# Patient Record
Sex: MALE | Race: WHITE | NOT HISPANIC OR LATINO | Employment: FULL TIME | URBAN - METROPOLITAN AREA
[De-identification: names, ages, dates, MRNs, and addresses within clinical notes are randomized per-mention and may not be internally consistent; named-entity substitution may affect disease eponyms.]

---

## 2017-02-21 ENCOUNTER — LAB VISIT (OUTPATIENT)
Dept: LAB | Facility: HOSPITAL | Age: 57
End: 2017-02-21
Attending: INTERNAL MEDICINE
Payer: COMMERCIAL

## 2017-02-21 ENCOUNTER — OFFICE VISIT (OUTPATIENT)
Dept: INTERNAL MEDICINE | Facility: CLINIC | Age: 57
End: 2017-02-21
Payer: COMMERCIAL

## 2017-02-21 VITALS
HEIGHT: 70 IN | TEMPERATURE: 97 F | OXYGEN SATURATION: 98 % | SYSTOLIC BLOOD PRESSURE: 140 MMHG | WEIGHT: 222.25 LBS | DIASTOLIC BLOOD PRESSURE: 92 MMHG | HEART RATE: 95 BPM | BODY MASS INDEX: 31.82 KG/M2

## 2017-02-21 DIAGNOSIS — R06.83 SNORING: ICD-10-CM

## 2017-02-21 DIAGNOSIS — E66.9 OBESITY (BMI 30.0-34.9): ICD-10-CM

## 2017-02-21 DIAGNOSIS — R51.9 NONINTRACTABLE HEADACHE, UNSPECIFIED CHRONICITY PATTERN, UNSPECIFIED HEADACHE TYPE: ICD-10-CM

## 2017-02-21 DIAGNOSIS — Z11.59 NEED FOR HEPATITIS C SCREENING TEST: ICD-10-CM

## 2017-02-21 DIAGNOSIS — K21.9 GASTROESOPHAGEAL REFLUX DISEASE, ESOPHAGITIS PRESENCE NOT SPECIFIED: Primary | ICD-10-CM

## 2017-02-21 PROCEDURE — 99214 OFFICE O/P EST MOD 30 MIN: CPT | Mod: S$GLB,,, | Performed by: INTERNAL MEDICINE

## 2017-02-21 PROCEDURE — 1160F RVW MEDS BY RX/DR IN RCRD: CPT | Mod: S$GLB,,, | Performed by: INTERNAL MEDICINE

## 2017-02-21 PROCEDURE — 84443 ASSAY THYROID STIM HORMONE: CPT

## 2017-02-21 PROCEDURE — 85025 COMPLETE CBC W/AUTO DIFF WBC: CPT

## 2017-02-21 PROCEDURE — 86803 HEPATITIS C AB TEST: CPT

## 2017-02-21 PROCEDURE — 99999 PR PBB SHADOW E&M-EST. PATIENT-LVL III: CPT | Mod: PBBFAC,,, | Performed by: INTERNAL MEDICINE

## 2017-02-21 PROCEDURE — 36415 COLL VENOUS BLD VENIPUNCTURE: CPT | Mod: PO

## 2017-02-21 PROCEDURE — 80053 COMPREHEN METABOLIC PANEL: CPT

## 2017-02-21 RX ORDER — ESOMEPRAZOLE MAGNESIUM 40 MG/1
40 CAPSULE, DELAYED RELEASE ORAL
Qty: 90 CAPSULE | Refills: 0 | Status: SHIPPED | OUTPATIENT
Start: 2017-02-21 | End: 2017-06-23

## 2017-02-21 NOTE — MR AVS SNAPSHOT
[facility]  [address] [facility] [place] [address]  Phone: [phone]  Fax: [phone]                  Myles Huynh   2017 4:00 PM   Office Visit    Description:  Male : 1960   Provider:  Marlon Mills MD   Department:  [facility] - Internal Medicine           Reason for Visit     Follow-up           Diagnoses this Visit        Comments    Gastroesophageal reflux disease, esophagitis presence not specified    -  Primary     Snoring         Obesity (BMI 30.0-34.9)         Nonintractable headache, unspecified chronicity pattern, unspecified headache type                To Do List           Future Appointments        Provider Department Dept Phone    2017 5:15 PM LAB, SAME DAY [facility] [phone]    2017 8:30 AM [facility] [phone]    2017 9:35 AM LABORATORY, [facility] [phone]    2017 8:40 AM Reno Pina MD [facility]ot[facility] Oncology [phone]      Goals (5 Years of Data)     None      Follow-Up and Disposition     Return if symptoms worsen or fail to improve.       These Medications        Disp Refills Start End    esomeprazole (NEXIUM) 40 MG capsule 90 capsule 0 2017 3/23/2017    Take 1 capsule (40 mg total) by mouth before breakfast. - Oral    Pharmacy: [facility] - [place], [place] - [address] [facility] [address] Ph #: [phone]         [facility]     [facility] -  Assistance  Registered nurses in the [facility] provide clinical advisement, health education, appointment booking, and other advisory services.  Call for this free service at [phone].             Medications           Message regarding Medications     Verify the changes and/or additions to your medication regime listed below are the same as discussed with your clinician today.  If any of these changes or  "additions are incorrect, please notify your healthcare provider.        START taking these NEW medications        Refills    esomeprazole (NEXIUM) 40 MG capsule 0    Sig: Take 1 capsule (40 mg total) by mouth before breakfast.    Class: Normal    Route: Oral      STOP taking these medications     omeprazole (PRILOSEC) 40 MG capsule Take 1 capsule by mouth once daily.           Verify that the below list of medications is an accurate representation of the medications you are currently taking.  If none reported, the list may be blank. If incorrect, please contact your healthcare provider. Carry this list with you in case of emergency.           Current Medications     rivaroxaban (XARELTO) 20 mg Tab Take 1 tablet (20 mg total) by mouth daily with dinner or evening meal.    esomeprazole (NEXIUM) 40 MG capsule Take 1 capsule (40 mg total) by mouth before breakfast.           Clinical Reference Information           Your Vitals Were     BP Pulse Temp Height Weight SpO2    140/92 (BP Location: Right arm, Patient Position: Sitting) 95 97.1 °F (36.2 °C) (Tympanic) 5' 10" (1.778 m) 100.8 kg (222 lb 3.6 oz) 98%    BMI                31.89 kg/m2          Blood Pressure          Most Recent Value    BP  (!)  140/92      Allergies as of 2/21/2017     Flagyl [Metronidazole]      Immunizations Administered on Date of Encounter - 2/21/2017     None      Orders Placed During Today's Visit      Normal Orders This Visit    Ambulatory referral to Pulmonology     Future Labs/Procedures Expected by Expires    CBC auto differential  2/21/2017 4/22/2018    Comprehensive metabolic panel  2/21/2017 4/22/2018    TSH  2/21/2017 4/22/2018      Instructions    Allegra over counter daily    Tylenol 500-1000 mg every 8 hours as needed for headache       Language Assistance Services     ATTENTION: Language assistance services are available, free of charge. Please call [phone].      ATENCIÓN: Si andrala day, tiene a villegas disposición servicios " gregos de asistencia lingüística. Giovanni jensen [phone].     YOVANY Ý: N?u b?n nói Ti?ng Vi?t, có các d?ch v? h? tr? ngôn ng? mi?n phí dành cho b?n. G?i s? [phone].         [facility] - [facility] complies with applicable Federal civil rights laws and does not discriminate on the basis of race, color, national origin, age, disability, or sex.

## 2017-02-21 NOTE — PROGRESS NOTES
"Subjective:      Patient ID: Myles Huynh is a 56 y.o. male.    Chief Complaint: Follow-up    HPI Comments: 55 yo with Patient Active Problem List:     External hemorrhoid     IBS (irritable bowel syndrome)     History of colon polyps     Acute deep vein thrombosis (DVT) of popliteal vein of left lower extremity     H/o environmental allergies    Here today c/o waking up with JOSEPH almost every morning. Snores. Feels rested in mornings.  HA. Does not improve until after dinner. occ light headed. No aggravating or relieving factors. Joseph is resolved by bed time.  Headache is pressure in nature.  Does not throb.  Rate is a 3-4 out of 10.  No significant associated symptoms.  No photophobia and no phonophobia no emesis  Also c/o "heart burn" burning chest pain to central right chest since December. egd with dilation summer 2016 with mahumza. Has assoc acid taste and belching. Saw mahumza dec with increase in omeprazole from 20 to 40. Pt reports long h/o of regurgitation since childhood. Admits to history of environmental ALLERGIES.  Has done ALLERGY shots with allergist.  He reports few if any current ALLERGY symptoms    Exercising more. Symptoms not preventing him from exercising.     Review of Systems   Constitutional: Negative for chills and fever.   HENT: Negative for ear pain and sore throat.    Eyes: Negative for photophobia, pain, discharge, redness, itching and visual disturbance.   Respiratory: Negative for cough, shortness of breath and wheezing.    Cardiovascular: Negative for chest pain and palpitations.   Gastrointestinal: Negative for abdominal pain, blood in stool, nausea and vomiting.   Genitourinary: Negative for dysuria and hematuria.   Musculoskeletal: Negative for arthralgias.   Skin: Negative for rash and wound.   Neurological: Positive for headaches. Negative for tremors, seizures, syncope, weakness and numbness.   Psychiatric/Behavioral: Negative for dysphoric mood. The patient is not " "nervous/anxious.      Objective:     Visit Vitals    BP (!) 140/92 (BP Location: Right arm, Patient Position: Sitting)    Pulse 95    Temp 97.1 °F (36.2 °C) (Tympanic)    Ht 5' 10" (1.778 m)    Wt 100.8 kg (222 lb 3.6 oz)    SpO2 98%    BMI 31.89 kg/m2       Physical Exam   Constitutional: He is oriented to person, place, and time. He appears well-developed and well-nourished. No distress.   HENT:   Head: Normocephalic and atraumatic.   Mouth/Throat: Oropharynx is clear and moist.   Eyes: EOM are normal. Pupils are equal, round, and reactive to light.   Neck: Neck supple. Carotid bruit is not present. No thyromegaly present.   Cardiovascular: Normal rate and regular rhythm.    Pulmonary/Chest: Breath sounds normal. He has no wheezes. He has no rales.   Abdominal: Soft. Bowel sounds are normal. There is no tenderness.   Musculoskeletal: He exhibits no edema.   Lymphadenopathy:     He has no cervical adenopathy.   Neurological: He is alert and oriented to person, place, and time.   Skin: Skin is warm and dry.   Psychiatric: He has a normal mood and affect. His behavior is normal.         Assessment:     1. Gastroesophageal reflux disease, esophagitis presence not specified    2. Snoring    3. Obesity (BMI 30.0-34.9)    4. Nonintractable headache, unspecified chronicity pattern, unspecified headache type      Plan:   Gastroesophageal reflux disease, esophagitis presence not specified  Change prilosec to nexium  -     esomeprazole (NEXIUM) 40 MG capsule; Take 1 capsule (40 mg total) by mouth before breakfast.  Dispense: 90 capsule; Refill: 0    Snoring  -     Ambulatory referral to Pulmonology    Obesity (BMI 30.0-34.9)  -     Ambulatory referral to Pulmonology    Nonintractable headache, unspecified chronicity pattern, unspecified headache type  -     Ambulatory referral to Pulmonology      Please get last egd report and pathology reports from [facility] over counter daily    Cbc, cmp, " tsh    Tylenol 500-1000 mg every 8 hours as needed for headache    Lab Frequency Next Occurrence   Hepatitis C antibody Once 12/23/2016   DRVVT Once 5/15/2017   HEXAGONAL PHOSPHOLIPID NEUTRALIZATION Once 5/15/2017   D dimer, quantitative Once 5/15/2017   US Lower Extremity Veins Left Once 5/15/2017         Return if symptoms worsen or fail to improve.

## 2017-02-22 LAB
ALBUMIN SERPL BCP-MCNC: 3.9 G/DL
ALP SERPL-CCNC: 70 U/L
ALT SERPL W/O P-5'-P-CCNC: 39 U/L
ANION GAP SERPL CALC-SCNC: 8 MMOL/L
AST SERPL-CCNC: 34 U/L
BASOPHILS # BLD AUTO: 0.05 K/UL
BASOPHILS NFR BLD: 0.9 %
BILIRUB SERPL-MCNC: 0.4 MG/DL
BUN SERPL-MCNC: 12 MG/DL
CALCIUM SERPL-MCNC: 9.2 MG/DL
CHLORIDE SERPL-SCNC: 107 MMOL/L
CO2 SERPL-SCNC: 27 MMOL/L
CREAT SERPL-MCNC: 1.1 MG/DL
DIFFERENTIAL METHOD: ABNORMAL
EOSINOPHIL # BLD AUTO: 0.1 K/UL
EOSINOPHIL NFR BLD: 2.4 %
ERYTHROCYTE [DISTWIDTH] IN BLOOD BY AUTOMATED COUNT: 13.1 %
EST. GFR  (AFRICAN AMERICAN): >60 ML/MIN/1.73 M^2
EST. GFR  (NON AFRICAN AMERICAN): >60 ML/MIN/1.73 M^2
GLUCOSE SERPL-MCNC: 90 MG/DL
HCT VFR BLD AUTO: 44.6 %
HGB BLD-MCNC: 15.1 G/DL
LYMPHOCYTES # BLD AUTO: 2.3 K/UL
LYMPHOCYTES NFR BLD: 43.3 %
MCH RBC QN AUTO: 32.3 PG
MCHC RBC AUTO-ENTMCNC: 33.9 %
MCV RBC AUTO: 95 FL
MONOCYTES # BLD AUTO: 0.5 K/UL
MONOCYTES NFR BLD: 9.8 %
NEUTROPHILS # BLD AUTO: 2.3 K/UL
NEUTROPHILS NFR BLD: 42.3 %
PLATELET # BLD AUTO: 239 K/UL
PMV BLD AUTO: 10.9 FL
POTASSIUM SERPL-SCNC: 4.4 MMOL/L
PROT SERPL-MCNC: 6.9 G/DL
RBC # BLD AUTO: 4.68 M/UL
SODIUM SERPL-SCNC: 142 MMOL/L
TSH SERPL DL<=0.005 MIU/L-ACNC: 1.74 UIU/ML
WBC # BLD AUTO: 5.33 K/UL

## 2017-02-23 LAB — HCV AB SERPL QL IA: NEGATIVE

## 2017-03-03 ENCOUNTER — PATIENT MESSAGE (OUTPATIENT)
Dept: INTERNAL MEDICINE | Facility: CLINIC | Age: 57
End: 2017-03-03

## 2017-03-03 ENCOUNTER — TELEPHONE (OUTPATIENT)
Dept: PULMONOLOGY | Facility: CLINIC | Age: 57
End: 2017-03-03

## 2017-03-03 DIAGNOSIS — I82.432 ACUTE DEEP VEIN THROMBOSIS (DVT) OF POPLITEAL VEIN OF LEFT LOWER EXTREMITY: ICD-10-CM

## 2017-04-03 ENCOUNTER — TELEPHONE (OUTPATIENT)
Dept: HEMATOLOGY/ONCOLOGY | Facility: CLINIC | Age: 57
End: 2017-04-03

## 2017-04-03 NOTE — TELEPHONE ENCOUNTER
----- Message from Richar Kidd III, LPN sent at 4/3/2017  7:22 AM CDT -----      ----- Message -----     From: Reno Pina MD     Sent: 3/30/2017  12:14 PM       To: Richar Kidd III, LPN    Please elethim know he will probably need to get another prescription for a month worth of xarelto, since he is scheduled to have the US of the legs by mid May. Have him ask pharmacy to request refill  DR PINA

## 2017-04-11 ENCOUNTER — TELEPHONE (OUTPATIENT)
Dept: HEMATOLOGY/ONCOLOGY | Facility: CLINIC | Age: 57
End: 2017-04-11

## 2017-04-11 NOTE — TELEPHONE ENCOUNTER
----- Message from Rosey Mckeon sent at 4/11/2017  1:21 PM CDT -----  Contact: PT  States he needs to speak to Micki regarding an upcoming procedure, he needs some more information. Please call pt at [phone]. Thank you

## 2017-04-12 ENCOUNTER — TELEPHONE (OUTPATIENT)
Dept: HEMATOLOGY/ONCOLOGY | Facility: CLINIC | Age: 57
End: 2017-04-12

## 2017-04-12 NOTE — TELEPHONE ENCOUNTER
Patient needs his ultrasound scheduled at [facility] on [address].  I advised that I would get it scheduled and give him a call.

## 2017-04-12 NOTE — TELEPHONE ENCOUNTER
----- Message from Pamela Huddleston sent at 4/12/2017  9:33 AM CDT -----  Contact: Pt   States he is calling rg more information on a procedure to be done and can be reached at [phone]//thanks/dbw

## 2017-04-24 ENCOUNTER — TELEPHONE (OUTPATIENT)
Dept: HEMATOLOGY/ONCOLOGY | Facility: CLINIC | Age: 57
End: 2017-04-24

## 2017-05-03 ENCOUNTER — OFFICE VISIT (OUTPATIENT)
Dept: INTERNAL MEDICINE | Facility: CLINIC | Age: 57
End: 2017-05-03
Payer: COMMERCIAL

## 2017-05-03 VITALS
OXYGEN SATURATION: 97 % | BODY MASS INDEX: 31.97 KG/M2 | TEMPERATURE: 97 F | HEIGHT: 70 IN | HEART RATE: 77 BPM | SYSTOLIC BLOOD PRESSURE: 142 MMHG | DIASTOLIC BLOOD PRESSURE: 84 MMHG | WEIGHT: 223.31 LBS

## 2017-05-03 DIAGNOSIS — T14.8XXA PUNCTURE WOUND: Primary | ICD-10-CM

## 2017-05-03 PROCEDURE — 1160F RVW MEDS BY RX/DR IN RCRD: CPT | Mod: S$GLB,,, | Performed by: PHYSICIAN ASSISTANT

## 2017-05-03 PROCEDURE — 90471 IMMUNIZATION ADMIN: CPT | Mod: S$GLB,,, | Performed by: PHYSICIAN ASSISTANT

## 2017-05-03 PROCEDURE — 90715 TDAP VACCINE 7 YRS/> IM: CPT | Mod: S$GLB,,, | Performed by: PHYSICIAN ASSISTANT

## 2017-05-03 PROCEDURE — 99999 PR PBB SHADOW E&M-EST. PATIENT-LVL III: CPT | Mod: PBBFAC,,, | Performed by: PHYSICIAN ASSISTANT

## 2017-05-03 PROCEDURE — 99213 OFFICE O/P EST LOW 20 MIN: CPT | Mod: 25,S$GLB,, | Performed by: PHYSICIAN ASSISTANT

## 2017-05-03 RX ORDER — SULFAMETHOXAZOLE AND TRIMETHOPRIM 800; 160 MG/1; MG/1
1 TABLET ORAL 2 TIMES DAILY
Qty: 10 TABLET | Refills: 0 | Status: SHIPPED | OUTPATIENT
Start: 2017-05-03 | End: 2017-05-08

## 2017-05-03 RX ORDER — MUPIROCIN 20 MG/G
OINTMENT TOPICAL 3 TIMES DAILY
Qty: 1 TUBE | Refills: 0 | Status: SHIPPED | OUTPATIENT
Start: 2017-05-03 | End: 2017-08-16

## 2017-05-03 NOTE — PATIENT INSTRUCTIONS
Step-by-Step:  Changing a Wound Dressing    Date Last Reviewed: 10/1/2016  © 2287-9270 [employer]. [address] [place] [address], [place] [address]. All rights reserved. This information is not intended as a substitute for professional medical care. Always follow your healthcare professional's instructions.        Self-Care for Cuts, Scrapes, and Burns  Cuts, scrapes, and burns are hard to avoid. Most minor injuries can be treated at home. A small wound may threaten your health if it causes severe blood loss or becomes infected. Call your doctor if a wound doesnt heal within a couple of weeks.  Call your health care provider right away if:  · You cant stop the bleeding.  · The wound covers a large area, is deep, or you can see muscles, tendons or bones.  · Your ear or eye is injured or burned.  · The burn is larger than the size of your palm, or is on your neck, face, foot, groin, or your hand.  · A puncture wound is deep or wide, or was caused by a dirty or javier object.  · You have signs of infection: fever, pus, pain, or redness.  · It has been 10 years or more since your last tetanus shot.   Caring for cuts, scrapes, and puncture wounds  If youre caring for someone else, remember to protect yourself from illnesses carried in blood and body fluids. Use latex gloves or whatever else is available (a towel, perhaps) as a barrier between you and the blood.  Control bleeding  · Apply direct pressure to a cut or scrape to stop bleeding.  · Allow a minor puncture wound to stop bleeding on its own, unless the bleeding is heavy. This may help clean out the wound.  Clean the wound  · Kill germs and remove the dirt by washing the wound with warm water and soap.  · Soak a minor puncture wound in warm, sudsy water for several minutes. Repeat this at least 2 times every day.  Cover the injury  · Hold the edges of a cut together with a butterfly bandage.  · Apply antibiotic ointment.  · For a cut or scrape, apply  an adhesive bandage or clean gauze. Tape it in place.  · Cover a minor puncture with gauze to absorb drainage and let in air to help with healing.  Treating minor burns  · Cool the burn immediately. Otherwise, the skin continues to hold heat and will keep burning. Use cloths soaked in cool water, place the burned area under a gentle stream of cool water, or submerge the burn in a full sink or bucket.  · Treat a minor burn like you treat a minor cut or scrape. Clean and cover it with a loose dressing.  · Do not put butter, oil, or ointment on a burn. This only seals in heat.  · Dont break blisters or pull off skin from a broken blister. This skin helps protect the healing skin underneath.  Date Last Reviewed: 11/30/2014  © 3919-0692 "[employer]. [address] [place] [address]. All rights reserved. This information is not intended as a substitute for professional medical care. Always follow your healthcare professional's instructions.        Incision Care  Remember: Follow-up visits allow your doctor to make sure your incision is healing well. Be sure to keep your appointments.   Stitches (sutures), surgical staples, special strips of surgical tape called Steri-Strips, or surgical skin glue may be used to close incisions. They also help stop bleeding and speed healing. To help your incision heal, follow the tips on this handout.  Home care     Steri-Strips   · Always wash your hands before touching your incision.  · Keep your incision clean and dry.  · Avoid doing things that could cause dirt or sweat to get on your incision.  · Dont pick at scabs. They help protect the wound.  · Keep your incision out of water.  · Take a sponge bath to avoid getting your incision wet, unless your healthcare provider tells you otherwise.  · Ask your provider when can you take a shower or bathe.  · Ask your provider about the best way to keep your incision dry when bathing or showering.  · Pat sutures dry if  they get wet. Dont rub.  · Leave the bandage (dressing) in place until you are told to remove it or change it. Change it only as directed, using clean hands.  · After the first 12 hours, change your dressing every 24 hours, or as directed by your healthcare provider.  · Change your dressing if it gets wet or soiled.  Care for specific closures  Follow these guidelines unless your healthcare provider tells you otherwise:  · Sutures or staples. Once you no longer need to keep these dry, clean the wound daily. First remove the bandage using clean hands. Then wash the area gently with soap and warm water. Use a wet cotton swab to loosen and remove any blood or crust that forms. After cleaning, put a thin layer of antibiotic ointment on. Then put on a new bandage.  · Skin glue. Dont put liquid, ointment, or cream on your wound while the glue is in place. Avoid activities that cause heavy sweating. Protect the wound from sunlight. Do not scratch, rub, or pick at the glue. Do not put tape directly over the glue. The glue should peel off within 5 to 10 days.  · Surgical tape. Keep the area dry. If it gets wet, blot the area dry with a clean towel. Surgical tape usually falls off within 7 to 10 days. If it has not fallen off after 10 days, contact your healthcare provider before taking it off yourself. If you are told to remove the tape, put mineral oil or petroleum jelly on a cotton ball. Gently rub the tape until it is removed.  Changing your dressing     Wash your hands before changing a dressing.    Leave the dressing (bandage) in place until you are told to remove it or change it. Follow the instructions below unless told otherwise by your healthcare provider.  · Always wash your hands before changing your dressing.  · After the first 48 hours the incision wound usually will have closed. At this point, leave the incision uncovered and open to the air. If the incision has not closed keep it covered.  · Cover your  incision only if your clothing is rubbing it or causing irritation.  · Change your dressing if it gets wet or soiled.  Follow-up care  Follow up with your healthcare provider to ask how long sutures or staples should be left in place. Be sure to return for suture or staple removal as directed. If dissolving stitches were used in your mouth, these will not need to be removed. They should fall out or dissolve on their own.  If tape closures were used, remove them yourself when your provider recommends if they have not fallen off on their own. If skin glue was used, the glue will wear off by itself.  When to seek medical care  Call your healthcare provider if you have any of the following:  · More pain, redness, swelling, bleeding, or foul-smelling discharge around the incision area  · Fever of 100.4°F (38ºC) or higher or as directed by your healthcare provider  · Shaking chills  · Vomiting or nausea that doesn't go away  · Numbness, coldness, or tingling around the incision area, or changes in skin color  · Opening of the sutures or wound  · Stitches or staples come apart or fall out or surgical tape falls off before 7 days or as directed by your healthcare provider   Date Last Reviewed: 10/16/2014  © 6041-3546 [employer]. [address] [place] [address], [place] [address]. All rights reserved. This information is not intended as a substitute for professional medical care. Always follow your healthcare professional's instructions.        Wound Care  Taking proper care of your wound will help it heal. Your healthcare provider may show you how to clean and dress the wound. He or she will also explain how to tell if the wound is healing normally. If you are unsure of how to take care of the wound, be sure to clarify what dressing to use and how often you should change the bandages. Here are the basic steps.     A wound that's not healing normally may be dark in color or have white streaks.   Wash your hands  Tips  for washing your hands include:  · Use liquid soap and lather for 2 minutes. Scrub between your fingers and under your nails.  · Rinse with warm water, keeping your fingers pointing down.  · Use a paper towel to dry your hands and to turn off the faucet.  Remove the used dressing  Here are suggestions for removing the dressing:  · If dressing changes cause you pain, be sure to take your pain medicine as prescribed by your healthcare provider 30 minutes before dressing changes.  · Set up your supplies.  · Put on disposable gloves if youre dressing a wound for someone else or your wound is infected.  · Loosen the tape by pulling gently toward the wound.  · Gently take off the old dressing. If the dressing is stuck to the wound, moisten it with saline (if available) or clean water.  · If you have a drain or tube in the wound, be careful not to pull on it.  · Remove the dressing 1 layer at a time and put it in a plastic bag.  · Remove your gloves.  Inspect and dress the wound  Check the wound carefully:  · Each time you change the dressing, inspect the wound carefully to be sure its healing normally by making sure your wound appears to be pink and moist, and is free of infection.    · Wash your hands again. Put on a new pair of gloves.  · Clean and dress the wound as directed by your healthcare provider or nurse. Do not put anything in the wound that is not prescribed or directed by your healthcare provider. If you have a drain or tube, be careful not to pull on it. Make sure to secure the drain or tube as well.  · Put all supplies in a plastic bag. Seal the bag and put it in the trash.  · Be sure to wash your hands again.  Call your healthcare provider  Call your healthcare provider if you see any of the following signs of a problem:  · Bleeding that soaks the dressing  · Pink fluid weeping from the wound  · Increased drainage or drainage that is yellow, yellow-green, or foul-smelling  · Increased swelling or pain,  or redness or swelling in the skin around the wound  · A change in the color of the wound, or if streaks develop in a direction away from the wound  · The area between any stitches opens up  · An increase in the size of the wound  · A fever of 100.4°F (38°C) or higher, or as directed by your healthcare provider  · Chills, increased fatigue, or a loss of appetite      Date Last Reviewed: 7/30/2015  © 9984-4384 [employer]. [address] [place] [address]. All rights reserved. This information is not intended as a substitute for professional medical care. Always follow your healthcare professional's instructions.

## 2017-05-03 NOTE — PROGRESS NOTES
"  Subjective:      Patient ID: Myles Huynh is a 56 y.o. male.    Chief Complaint: Puncture Wound (right leg)    Laceration    The incident occurred 5 to 7 days ago. Pain location: right lower leg (anterior) The laceration is 2 cm in size. Injury mechanism: branch  The pain is at a severity of 4/10. The pain has been worsening since onset. It is unknown if a foreign body is present. His tetanus status is unknown.       Review of Systems   Constitutional: Negative for activity change, appetite change, chills, diaphoresis, fatigue, fever and unexpected weight change.   HENT: Negative.  Negative for congestion, hearing loss, postnasal drip, rhinorrhea, sore throat, trouble swallowing and voice change.    Eyes: Negative.  Negative for visual disturbance.   Respiratory: Negative.  Negative for cough, choking, chest tightness and shortness of breath.    Cardiovascular: Negative for chest pain, palpitations and leg swelling.   Gastrointestinal: Negative for abdominal distention, abdominal pain, blood in stool, constipation, diarrhea, nausea and vomiting.   Endocrine: Negative for cold intolerance, heat intolerance, polydipsia and polyuria.   Genitourinary: Negative.  Negative for difficulty urinating and frequency.   Musculoskeletal: Negative for arthralgias, back pain, gait problem, joint swelling and myalgias.   Skin: Positive for color change and wound. Negative for pallor and rash.   Neurological: Negative for dizziness, tremors, weakness, light-headedness, numbness and headaches.   Hematological: Negative for adenopathy.   Psychiatric/Behavioral: Negative for behavioral problems, confusion, self-injury, sleep disturbance and suicidal ideas. The patient is not nervous/anxious.      Objective:   BP (!) 142/84 (BP Location: Right arm, Patient Position: Sitting)  Pulse 77  Temp 97.3 °F (36.3 °C) (Tympanic)   Ht 5' 10" (1.778 m)  Wt 101.3 kg (223 lb 5.2 oz)  SpO2 97%  BMI 32.04 kg/m2    Physical Exam "   Constitutional: He appears well-developed and well-nourished. No distress.   HENT:   Head: Normocephalic and atraumatic.   Cardiovascular: Normal rate and regular rhythm.    Pulmonary/Chest: Effort normal and breath sounds normal.   Skin: Skin is warm. Abrasion noted. He is not diaphoretic.        Psychiatric: He has a normal mood and affect. His behavior is normal. Judgment and thought content normal.   Nursing note and vitals reviewed.      Assessment:     1. Puncture wound      Plan:   Puncture wound  -     Tdap Vaccine  -     mupirocin (BACTROBAN) 2 % ointment; Apply topically 3 (three) times daily.  Dispense: 1 Tube; Refill: 0  -     sulfamethoxazole-trimethoprim 800-160mg (BACTRIM DS) 800-160 mg Tab; Take 1 tablet by mouth 2 (two) times daily.  Dispense: 10 tablet; Refill: 0    -a handout on proper wound care was printed out for him today. If symptoms start to worsen will need to increase duration of antibiotic or possibly switch.   -Educational handout on at-home conservative care, pertinent to the patients diagnosis today, was handed to the patient and discussed in detail.      Return if symptoms worsen or fail to improve.

## 2017-05-03 NOTE — MR AVS SNAPSHOT
[facility] Internal Medicine  [address] [place] [address]  Phone: [phone]  Fax: [phone]                  Myles Huynh   5/3/2017 11:00 AM   Office Visit    Description:  Male : 1960   Provider:  Annmarie Page PA-C   Department:  [facility] Internal Medicine           Reason for Visit     Puncture Wound           Diagnoses this Visit        Comments    Puncture wound    -  Primary            To Do List           Future Appointments        Provider Department Dept Phone    5/3/2017 11:00 AM Annmarie Page PA-C [facility] Internal Medicine [phone]    2017 9:35 AM LABORATORY, [place] [facility] [phone]    2017 8:40 AM Reno Pina MD [facility] Hemotology Oncology [phone]      Goals (5 Years of Data)     None       These Medications        Disp Refills Start End    mupirocin (BACTROBAN) 2 % ointment 1 Tube 0 5/3/2017     Apply topically 3 (three) times daily. - Topical (Top)    Pharmacy: [facility] - [place] - [address] [address] Ph #: [phone]       sulfamethoxazole-trimethoprim 800-160mg (BACTRIM DS) 800-160 mg Tab 10 tablet 0 5/3/2017 2017    Take 1 tablet by mouth 2 (two) times daily. - Oral    Pharmacy: [facility] - [place] - [address] [address] [facility] Ph #: [phone]         [facility]     [facility] -  Assistance  Unless otherwise directed by your provider, please contact [facility], our nurse care line that is available for  assistance.     Registered nurses in the [facility] provide: appointment scheduling, clinical advisement, health education, and other advisory services.  Call: [phone] (toll free)               Medications           Message regarding Medications     Verify the changes and/or additions to your medication regime listed below are the same as discussed  "with your clinician today.  If any of these changes or additions are incorrect, please notify your healthcare provider.        START taking these NEW medications        Refills    mupirocin (BACTROBAN) 2 % ointment 0    Sig: Apply topically 3 (three) times daily.    Class: Normal    Route: Topical (Top)    sulfamethoxazole-trimethoprim 800-160mg (BACTRIM DS) 800-160 mg Tab 0    Sig: Take 1 tablet by mouth 2 (two) times daily.    Class: Normal    Route: Oral           Verify that the below list of medications is an accurate representation of the medications you are currently taking.  If none reported, the list may be blank. If incorrect, please contact your healthcare provider. Carry this list with you in case of emergency.           Current Medications     esomeprazole (NEXIUM) 40 MG capsule Take 1 capsule (40 mg total) by mouth before breakfast.    rivaroxaban (XARELTO) 20 mg Tab Take 1 tablet (20 mg total) by mouth daily with dinner or evening meal.    mupirocin (BACTROBAN) 2 % ointment Apply topically 3 (three) times daily.    sulfamethoxazole-trimethoprim 800-160mg (BACTRIM DS) 800-160 mg Tab Take 1 tablet by mouth 2 (two) times daily.           Clinical Reference Information           Your Vitals Were     BP Pulse Temp Height Weight SpO2    142/84 (BP Location: Right arm, Patient Position: Sitting) 77 97.3 °F (36.3 °C) (Tympanic) 5' 10" (1.778 m) 101.3 kg (223 lb 5.2 oz) 97%    BMI                32.04 kg/m2          Blood Pressure          Most Recent Value    BP  (!)  142/84      Allergies as of 5/3/2017     Flagyl [Metronidazole]      Immunizations Administered on Date of Encounter - 5/3/2017     Name Date Dose VIS Date Route    TDAP  Incomplete 0.5 mL 2/24/2015 Intramuscular      Orders Placed During Today's Visit      Normal Orders This Visit    Tdap Vaccine       Instructions      Step-by-Step:  Changing a Wound Dressing    Date Last Reviewed: 10/1/2016  © 7026-7168 [employer]. [address] [place] [address] " [place] [address]. All rights reserved. This information is not intended as a substitute for professional medical care. Always follow your healthcare professional's instructions.        Self-Care for Cuts, Scrapes, and Burns  Cuts, scrapes, and burns are hard to avoid. Most minor injuries can be treated at home. A small wound may threaten your health if it causes severe blood loss or becomes infected. Call your doctor if a wound doesnt heal within a couple of weeks.  Call your health care provider right away if:  · You cant stop the bleeding.  · The wound covers a large area, is deep, or you can see muscles, tendons or bones.  · Your ear or eye is injured or burned.  · The burn is larger than the size of your palm, or is on your neck, face, foot, groin, or your hand.  · A puncture wound is deep or wide, or was caused by a dirty or javier object.  · You have signs of infection: fever, pus, pain, or redness.  · It has been 10 years or more since your last tetanus shot.   Caring for cuts, scrapes, and puncture wounds  If youre caring for someone else, remember to protect yourself from illnesses carried in blood and body fluids. Use latex gloves or whatever else is available (a towel, perhaps) as a barrier between you and the blood.  Control bleeding  · Apply direct pressure to a cut or scrape to stop bleeding.  · Allow a minor puncture wound to stop bleeding on its own, unless the bleeding is heavy. This may help clean out the wound.  Clean the wound  · Kill germs and remove the dirt by washing the wound with warm water and soap.  · Soak a minor puncture wound in warm, sudsy water for several minutes. Repeat this at least 2 times every day.  Cover the injury  · Hold the edges of a cut together with a butterfly bandage.  · Apply antibiotic ointment.  · For a cut or scrape, apply an adhesive bandage or clean gauze. Tape it in place.  · Cover a minor puncture with gauze to absorb drainage and let in air to  help with healing.  Treating minor burns  · Cool the burn immediately. Otherwise, the skin continues to hold heat and will keep burning. Use cloths soaked in cool water, place the burned area under a gentle stream of cool water, or submerge the burn in a full sink or bucket.  · Treat a minor burn like you treat a minor cut or scrape. Clean and cover it with a loose dressing.  · Do not put butter, oil, or ointment on a burn. This only seals in heat.  · Dont break blisters or pull off skin from a broken blister. This skin helps protect the healing skin underneath.  Date Last Reviewed: 11/30/2014  © 7009-3281 [employer]. [address] [place] [address], [place] [address]. All rights reserved. This information is not intended as a substitute for professional medical care. Always follow your healthcare professional's instructions.        Incision Care  Remember: Follow-up visits allow your doctor to make sure your incision is healing well. Be sure to keep your appointments.   Stitches (sutures), surgical staples, special strips of surgical tape called Steri-Strips, or surgical skin glue may be used to close incisions. They also help stop bleeding and speed healing. To help your incision heal, follow the tips on this handout.  Home care     Steri-Strips   · Always wash your hands before touching your incision.  · Keep your incision clean and dry.  · Avoid doing things that could cause dirt or sweat to get on your incision.  · Dont pick at scabs. They help protect the wound.  · Keep your incision out of water.  · Take a sponge bath to avoid getting your incision wet, unless your healthcare provider tells you otherwise.  · Ask your provider when can you take a shower or bathe.  · Ask your provider about the best way to keep your incision dry when bathing or showering.  · Pat sutures dry if they get wet. Dont rub.  · Leave the bandage (dressing) in place until you are told to remove it or change it. Change it only  as directed, using clean hands.  · After the first 12 hours, change your dressing every 24 hours, or as directed by your healthcare provider.  · Change your dressing if it gets wet or soiled.  Care for specific closures  Follow these guidelines unless your healthcare provider tells you otherwise:  · Sutures or staples. Once you no longer need to keep these dry, clean the wound daily. First remove the bandage using clean hands. Then wash the area gently with soap and warm water. Use a wet cotton swab to loosen and remove any blood or crust that forms. After cleaning, put a thin layer of antibiotic ointment on. Then put on a new bandage.  · Skin glue. Dont put liquid, ointment, or cream on your wound while the glue is in place. Avoid activities that cause heavy sweating. Protect the wound from sunlight. Do not scratch, rub, or pick at the glue. Do not put tape directly over the glue. The glue should peel off within 5 to 10 days.  · Surgical tape. Keep the area dry. If it gets wet, blot the area dry with a clean towel. Surgical tape usually falls off within 7 to 10 days. If it has not fallen off after 10 days, contact your healthcare provider before taking it off yourself. If you are told to remove the tape, put mineral oil or petroleum jelly on a cotton ball. Gently rub the tape until it is removed.  Changing your dressing     Wash your hands before changing a dressing.    Leave the dressing (bandage) in place until you are told to remove it or change it. Follow the instructions below unless told otherwise by your healthcare provider.  · Always wash your hands before changing your dressing.  · After the first 48 hours the incision wound usually will have closed. At this point, leave the incision uncovered and open to the air. If the incision has not closed keep it covered.  · Cover your incision only if your clothing is rubbing it or causing irritation.  · Change your dressing if it gets wet or soiled.  Follow-up  care  Follow up with your healthcare provider to ask how long sutures or staples should be left in place. Be sure to return for suture or staple removal as directed. If dissolving stitches were used in your mouth, these will not need to be removed. They should fall out or dissolve on their own.  If tape closures were used, remove them yourself when your provider recommends if they have not fallen off on their own. If skin glue was used, the glue will wear off by itself.  When to seek medical care  Call your healthcare provider if you have any of the following:  · More pain, redness, swelling, bleeding, or foul-smelling discharge around the incision area  · Fever of 100.4°F (38ºC) or higher or as directed by your healthcare provider  · Shaking chills  · Vomiting or nausea that doesn't go away  · Numbness, coldness, or tingling around the incision area, or changes in skin color  · Opening of the sutures or wound  · Stitches or staples come apart or fall out or surgical tape falls off before 7 days or as directed by your healthcare provider   Date Last Reviewed: 10/16/2014  © 1769-7122 [employer]. [address] [place] [address]. All rights reserved. This information is not intended as a substitute for professional medical care. Always follow your healthcare professional's instructions.        Wound Care  Taking proper care of your wound will help it heal. Your healthcare provider may show you how to clean and dress the wound. He or she will also explain how to tell if the wound is healing normally. If you are unsure of how to take care of the wound, be sure to clarify what dressing to use and how often you should change the bandages. Here are the basic steps.     A wound that's not healing normally may be dark in color or have white streaks.   Wash your hands  Tips for washing your hands include:  · Use liquid soap and lather for 2 minutes. Scrub between your fingers and under your  nails.  · Rinse with warm water, keeping your fingers pointing down.  · Use a paper towel to dry your hands and to turn off the faucet.  Remove the used dressing  Here are suggestions for removing the dressing:  · If dressing changes cause you pain, be sure to take your pain medicine as prescribed by your healthcare provider 30 minutes before dressing changes.  · Set up your supplies.  · Put on disposable gloves if youre dressing a wound for someone else or your wound is infected.  · Loosen the tape by pulling gently toward the wound.  · Gently take off the old dressing. If the dressing is stuck to the wound, moisten it with saline (if available) or clean water.  · If you have a drain or tube in the wound, be careful not to pull on it.  · Remove the dressing 1 layer at a time and put it in a plastic bag.  · Remove your gloves.  Inspect and dress the wound  Check the wound carefully:  · Each time you change the dressing, inspect the wound carefully to be sure its healing normally by making sure your wound appears to be pink and moist, and is free of infection.    · Wash your hands again. Put on a new pair of gloves.  · Clean and dress the wound as directed by your healthcare provider or nurse. Do not put anything in the wound that is not prescribed or directed by your healthcare provider. If you have a drain or tube, be careful not to pull on it. Make sure to secure the drain or tube as well.  · Put all supplies in a plastic bag. Seal the bag and put it in the trash.  · Be sure to wash your hands again.  Call your healthcare provider  Call your healthcare provider if you see any of the following signs of a problem:  · Bleeding that soaks the dressing  · Pink fluid weeping from the wound  · Increased drainage or drainage that is yellow, yellow-green, or foul-smelling  · Increased swelling or pain, or redness or swelling in the skin around the wound  · A change in the color of the wound, or if streaks develop in a  direction away from the wound  · The area between any stitches opens up  · An increase in the size of the wound  · A fever of 100.4°F (38°C) or higher, or as directed by your healthcare provider  · Chills, increased fatigue, or a loss of appetite      Date Last Reviewed: 7/30/2015  © 1119-7216 [employer]. [address] [place] [address]. All rights reserved. This information is not intended as a substitute for professional medical care. Always follow your healthcare professional's instructions.             Language Assistance Services     ATTENTION: Language assistance services are available, free of charge. Please call [phone].      ATENCIÓN: Si andrala day, tiene a villegas disposición servicios gratuitos de asistencia lingüística. Llame al [phone].     CHÚ Ý: N?u b?n nói Ti?ng Vi?t, có các d?ch v? h? tr? ngôn ng? mi?n phí dành cho b?n. G?i s? [phone].         [facility] Internal [facility] complies with applicable Federal civil rights laws and does not discriminate on the basis of race, color, national origin, age, disability, or sex.

## 2017-05-17 ENCOUNTER — LAB VISIT (OUTPATIENT)
Dept: LAB | Facility: HOSPITAL | Age: 57
End: 2017-05-17
Attending: INTERNAL MEDICINE
Payer: COMMERCIAL

## 2017-05-17 DIAGNOSIS — I82.432 ACUTE DEEP VEIN THROMBOSIS (DVT) OF POPLITEAL VEIN OF LEFT LOWER EXTREMITY: ICD-10-CM

## 2017-05-17 LAB — D DIMER PPP IA.FEU-MCNC: <0.19 MG/L FEU

## 2017-05-17 PROCEDURE — 85379 FIBRIN DEGRADATION QUANT: CPT

## 2017-05-17 PROCEDURE — 85613 RUSSELL VIPER VENOM DILUTED: CPT

## 2017-05-17 PROCEDURE — 85598 HEXAGNAL PHOSPH PLTLT NEUTRL: CPT

## 2017-05-19 LAB — APTT HEX PL PPP: NEGATIVE S

## 2017-05-22 LAB — APTT HEX PL PPP: NEGATIVE S

## 2017-05-23 ENCOUNTER — OFFICE VISIT (OUTPATIENT)
Dept: HEMATOLOGY/ONCOLOGY | Facility: CLINIC | Age: 57
End: 2017-05-23
Payer: COMMERCIAL

## 2017-05-23 ENCOUNTER — TELEPHONE (OUTPATIENT)
Dept: HEMATOLOGY/ONCOLOGY | Facility: CLINIC | Age: 57
End: 2017-05-23

## 2017-05-23 VITALS
SYSTOLIC BLOOD PRESSURE: 130 MMHG | DIASTOLIC BLOOD PRESSURE: 90 MMHG | WEIGHT: 222.44 LBS | OXYGEN SATURATION: 96 % | RESPIRATION RATE: 18 BRPM | HEIGHT: 70 IN | HEART RATE: 76 BPM | BODY MASS INDEX: 31.85 KG/M2 | TEMPERATURE: 97 F

## 2017-05-23 DIAGNOSIS — I82.432 ACUTE DEEP VEIN THROMBOSIS (DVT) OF POPLITEAL VEIN OF LEFT LOWER EXTREMITY: Primary | ICD-10-CM

## 2017-05-23 DIAGNOSIS — I82.532 CHRONIC DEEP VEIN THROMBOSIS (DVT) OF LEFT POPLITEAL VEIN: Primary | ICD-10-CM

## 2017-05-23 PROCEDURE — 99213 OFFICE O/P EST LOW 20 MIN: CPT | Mod: S$GLB,,, | Performed by: INTERNAL MEDICINE

## 2017-05-23 PROCEDURE — 1160F RVW MEDS BY RX/DR IN RCRD: CPT | Mod: S$GLB,,, | Performed by: INTERNAL MEDICINE

## 2017-05-23 PROCEDURE — 99999 PR PBB SHADOW E&M-EST. PATIENT-LVL III: CPT | Mod: PBBFAC,,, | Performed by: INTERNAL MEDICINE

## 2017-05-23 NOTE — TELEPHONE ENCOUNTER
----- Message from Reno Pina MD sent at 5/23/2017  7:33 AM CDT -----  I see this patient has an appointment for tomorrow .  Did he want to be seen in [place].?  Also, my notes from 6 months ago indicate he needed to have a left doppler Us before the visit, and I do not see one done.  Please call him. If he has not had the Us, see if it can be done today.  If not, he needs to have one before he sees me.  Also, clarify this issue of the visit scheduled in [place]   Thanks  DR PINA

## 2017-05-23 NOTE — TELEPHONE ENCOUNTER
Rescheduled patient for today.  He got his imaging at [facility] because of the cost.   [facility] to fax results.

## 2017-05-23 NOTE — PROGRESS NOTES
Subjective:       Patient ID: Myles Huynh is a 56 y.o. male.    Chief Complaint: Follow-up    HPI his is a 55-year-old  gentleman who was diagnosed as having a left lower extremity DVT.  The report of 11/04/2016 is that there is a left   popliteal occlusive thrombus extending in the posterior tibial veins.     The patient Was started on Xarelto 15 mg po twice daily,and now is on 20 mg a day.  Hypercoagulable work up is negative.  He asked to be sent today because he ah continued with progressive discomfort in the left leg and now also on the right leg  ALLERGIES: Flagyl.     MEDICATIONS:  1. Naprosyn 500 mg twice a day.  2. Robaxin 500 mg.  3. Omeprazole 40 mg once a day.     SOCIAL HISTORY: He is , with two children. Lives in [place]. He   does not smoke. He accepts that he drinks too much beer. He works as a   .     PREVIOUS SURGERIES: Appendectomy.     FAMILY HISTORY: Brother had colon cancer. The same brother had diabetes.   Maternal cousins and aunts as well as maternal grandfather had diabetes. Father  had a heart attack. No relatives with abnormal clotting problems.     PAST MEDICAL HISTORY:  1. Irritable bowel syndrome.  2. Alcohol use.  3. Left popliteal vein thrombosis.     Review of Systems   Constitutional: Negative.  Negative for fatigue.   Eyes: Negative.    Cardiovascular: Negative.  Negative for chest pain.   Gastrointestinal: Negative for abdominal pain and nausea.   Genitourinary: Negative.  Negative for hematuria.   Musculoskeletal: Negative.    Skin: Negative.    Neurological: Negative.    Psychiatric/Behavioral: Negative.           Review of Systems   Constitutional: Negative.  Negative for appetite change, fatigue and unexpected weight change.   Eyes: Negative.  Negative for visual disturbance.   Respiratory: Negative for cough and shortness of breath.    Cardiovascular: Negative.  Negative for chest pain.   Gastrointestinal: Negative for  abdominal pain, diarrhea and nausea.   Genitourinary: Negative.  Negative for frequency and hematuria.   Musculoskeletal: Negative.  Negative for back pain.   Skin: Negative.  Negative for rash.   Neurological: Positive for headaches.   Hematological: Negative for adenopathy.   Psychiatric/Behavioral: The patient is nervous/anxious.        Objective:      Physical Exam   HENT:   Head: Normocephalic.   Eyes: Pupils are equal, round, and reactive to light.   Neck: Normal range of motion.   Cardiovascular: Normal rate.    Pulmonary/Chest: Effort normal.   Musculoskeletal: Normal range of motion.   Neurological: He is alert.   Skin: Skin is warm.   Psychiatric: He has a normal mood and affect. His behavior is normal. Judgment and thought content normal.       Assessment:       1. Acute deep vein thrombosis (DVT) of popliteal vein of left lower extremity        Plan:       Doppler US of the left leg done at the [facility] a few days ago was reported as showing resolution  of the thrombus.  D-dimer normal. Asked to stop anticoagulation.  Next visit open       ADDENDUM 5/25/2017    It has come back to my attention that hi slupus anticoagulant test at the time of the diagnosis showed a weakly positive DRVVT and a negative hexagonal antiphospholipid meutra;ization test, suggesting a transient  posiitive titer.  This was repeated a few days ago , six months later with one gain the same results.  I have discussed the case with national experts in anticoagulation from [facility].  We discussed that Xarelto can cause elevation of th DRVVt so this by itself is not that important.  The question is whther this was a provoked or an unprovoked DVt  There is a history of trauma,to the area but is unclear how significant this trauma  was.  Usually unproved DVts require longer anticoagulation, provoked ones require much less.  I have called the patient and went thorugh  his initial  presentation.  I am unable to decide  whether this was provoked or not.  My consultant suggested to recheck the d-dimer four weeks off the Xarelto.  If it remains down a month after being off Xarelto, then his chances of having a repeat DVt are less than 10%, and it would be OK to follow him expectantly.  If it has gone up, his chances are higher and he needs long term anticoagulation.  I called the patient and we went over all of the above. He will see me in 4 weeks after he has a repeat d-dimer off Xarelto  ok

## 2017-05-25 DIAGNOSIS — I82.592 CHRONIC DEEP VEIN THROMBOSIS (DVT) OF OTHER VEIN OF LEFT LOWER EXTREMITY: Primary | ICD-10-CM

## 2017-05-25 LAB — LA PPP-IMP: NORMAL

## 2017-05-26 ENCOUNTER — PATIENT MESSAGE (OUTPATIENT)
Dept: HEMATOLOGY/ONCOLOGY | Facility: CLINIC | Age: 57
End: 2017-05-26

## 2017-05-26 ENCOUNTER — TELEPHONE (OUTPATIENT)
Dept: HEMATOLOGY/ONCOLOGY | Facility: CLINIC | Age: 57
End: 2017-05-26

## 2017-06-01 ENCOUNTER — PATIENT MESSAGE (OUTPATIENT)
Dept: HEMATOLOGY/ONCOLOGY | Facility: CLINIC | Age: 57
End: 2017-06-01

## 2017-06-06 ENCOUNTER — PATIENT MESSAGE (OUTPATIENT)
Dept: HEMATOLOGY/ONCOLOGY | Facility: CLINIC | Age: 57
End: 2017-06-06

## 2017-06-20 ENCOUNTER — LAB VISIT (OUTPATIENT)
Dept: LAB | Facility: HOSPITAL | Age: 57
End: 2017-06-20
Attending: INTERNAL MEDICINE
Payer: COMMERCIAL

## 2017-06-20 DIAGNOSIS — I82.592 CHRONIC DEEP VEIN THROMBOSIS (DVT) OF OTHER VEIN OF LEFT LOWER EXTREMITY: ICD-10-CM

## 2017-06-20 LAB — D DIMER PPP IA.FEU-MCNC: <0.19 MG/L FEU

## 2017-06-20 PROCEDURE — 85379 FIBRIN DEGRADATION QUANT: CPT

## 2017-06-20 PROCEDURE — 36415 COLL VENOUS BLD VENIPUNCTURE: CPT | Mod: PO

## 2017-06-23 ENCOUNTER — OFFICE VISIT (OUTPATIENT)
Dept: HEMATOLOGY/ONCOLOGY | Facility: CLINIC | Age: 57
End: 2017-06-23
Payer: COMMERCIAL

## 2017-06-23 VITALS
HEIGHT: 70 IN | BODY MASS INDEX: 31.53 KG/M2 | WEIGHT: 220.25 LBS | HEART RATE: 82 BPM | TEMPERATURE: 98 F | SYSTOLIC BLOOD PRESSURE: 153 MMHG | DIASTOLIC BLOOD PRESSURE: 91 MMHG | OXYGEN SATURATION: 97 %

## 2017-06-23 DIAGNOSIS — I82.502 CHRONIC DEEP VEIN THROMBOSIS (DVT) OF LEFT LOWER EXTREMITY, UNSPECIFIED VEIN: Primary | ICD-10-CM

## 2017-06-23 DIAGNOSIS — I82.5Z2 CHRONIC DEEP VEIN THROMBOSIS (DVT) OF DISTAL VEIN OF LEFT LOWER EXTREMITY: ICD-10-CM

## 2017-06-23 PROCEDURE — 99214 OFFICE O/P EST MOD 30 MIN: CPT | Mod: S$GLB,,, | Performed by: INTERNAL MEDICINE

## 2017-06-23 PROCEDURE — 99999 PR PBB SHADOW E&M-EST. PATIENT-LVL III: CPT | Mod: PBBFAC,,, | Performed by: INTERNAL MEDICINE

## 2017-06-23 NOTE — PROGRESS NOTES
Subjective:       Patient ID: Myles Huynh is a 56 y.o. male.    Chief Complaint: No chief complaint on file.    HPI   his is a 55-year-old  gentleman who was diagnosed as having a left lower extremity DVT.  The report of 11/04/2016 is that there is a left   popliteal occlusive thrombus extending in the posterior tibial veins.  His  slupus anticoagulant test at the time of the diagnosis showed a weakly positive DRVVT and a negative hexagonal antiphospholipid meutra;ization test, suggesting a transient  posiitive titer.  This was repeated   six months later with once gain the same results.  I have discussed the case with national experts in anticoagulation from [facility].  We discussed that Xarelto can cause elevation of th DRVVt so this by itself is not that important.  The question is whther this was a provoked or an unprovoked DVt  There is a history of trauma,to the area but is unclear how significant this trauma  was.  Usually unproved DVts require longer anticoagulation, provoked ones require much less.  I have called the patient and went thorugh  his initial  presentation.  I am unable to decide whether this was provoked or not.  My consultant suggested to recheck the d-dimer four weeks off the Xarelto.  If it remains down a month after being off Xarelto, then his chances of having a repeat DVt are less than 10%, and it would be OK to follow him expectantly.  If it has gone up, his chances are higher and he needs long term anticoagulation.  I  He has had a repeat d-dimer off the xarelto and is normal.  The has  continued with progressive discomfort in the left leg and is concerned about recurrence of the blood clot  ALLERGIES: see med card     MEDICATIONS:see med card   SOCIAL HISTORY: He is , with two children. Lives in [place]. He   does not smoke. He accepts that he drinks too much beer. He works as a   .     PREVIOUS SURGERIES: Appendectomy.     FAMILY  HISTORY: Brother had colon cancer. The same brother had diabetes.   Maternal cousins and aunts as well as maternal grandfather had diabetes. Father  had a heart attack. No relatives with abnormal clotting problems.     PAST MEDICAL HISTORY:  1. Irritable bowel syndrome.  2. Alcohol use.  3. Left popliteal vein thrombosis.     Review of Systems   Constitutional: Negative.  Negative for fatigue.   Eyes: Negative.    Cardiovascular: Negative.  Negative for chest pain.   Gastrointestinal: Negative for abdominal pain and nausea.   Genitourinary: Negative.  Negative for hematuria.   Musculoskeletal: Negative.    Skin: Negative.    Neurological: Negative.    Psychiatric/Behavioral: Negative.          Review of Systems   Constitutional: Negative.  Negative for appetite change, fatigue and unexpected weight change.   Eyes: Negative.  Negative for visual disturbance.   Respiratory: Negative for cough and shortness of breath.    Cardiovascular: Negative.  Negative for chest pain.   Gastrointestinal: Negative for abdominal pain, diarrhea and nausea.   Genitourinary: Negative.  Negative for frequency and hematuria.   Musculoskeletal: Negative.  Negative for back pain.   Skin: Negative.  Negative for rash.   Neurological: Positive for headaches.   Hematological: Negative for adenopathy.   Psychiatric/Behavioral: The patient is nervous/anxious.        Objective:      Physical Exam   HENT:   Head: Normocephalic.   Eyes: Pupils are equal, round, and reactive to light.   Neck: Normal range of motion.   Cardiovascular: Normal rate.    Pulmonary/Chest: Effort normal.   Musculoskeletal: Normal range of motion.   Neurological: He is alert.   Skin: Skin is warm.   Psychiatric: He has a normal mood and affect. His behavior is normal. Judgment and thought content normal.       Assessment:       1. Acute deep vein thrombosis (DVT) of popliteal vein of left lower extremity        Plan:       Doppler US of the left leg done at the [facility] a few days ago was reported as showing resolution  of the thrombus.  D-dimer normal. Asked to stop anticoagulation.  Next visit open        ADDENDUM 5/25/2017     It has come back to my attention that hi slupus anticoagulant test at the time of the diagnosis showed a weakly positive DRVVT and a negative hexagonal antiphospholipid meutra;ization test, suggesting a transient  posiitive titer.  This was repeated a few days ago , six months later with one gain the same results.  I have discussed the case with national experts in anticoagulation from [facility].  We discussed that Xarelto can cause elevation of th DRVVt so this by itself is not that important.  The question is whther this was a provoked or an unprovoked DVt  There is a history of trauma,to the area but is unclear how significant this trauma  was.  Usually unproved DVts require longer anticoagulation, provoked ones require much less.  I have called the patient and went thorugh  his initial  presentation.  I am unable to decide whether this was provoked or not.  My consultant suggested to recheck the d-dimer four weeks off the Xarelto.  If it remains down a month after being off Xarelto, then his chances of having a repeat DVt are less than 10%, and it would be OK to follow him expectantly.  If it has gone up, his chances are higher and he needs long term anticoagulation.  I called the patient and we went over all of the above. He will see me in 4 weeks after he has a repeat d-dimer off Xarelto  ok      Electronically signed by Reno Pina MD at 5/23/2017  2:48 PM  Electronically signed by Reno Pina,        Review of Systems   Constitutional: Negative.  Negative for fatigue.   Eyes: Negative.    Cardiovascular: Negative.  Negative for chest pain.   Gastrointestinal: Negative for abdominal pain and nausea.   Genitourinary: Negative.  Negative for hematuria.   Musculoskeletal: Negative.    Skin: Negative.    Neurological: Negative.     Psychiatric/Behavioral: Negative.        Objective:      Physical Exam   Constitutional: He is oriented to person, place, and time. He appears well-developed and well-nourished.   HENT:   Head: Normocephalic.   Mouth/Throat: No oropharyngeal exudate.   Eyes: Pupils are equal, round, and reactive to light.   Neck: No thyromegaly present.   Cardiovascular: Normal rate, regular rhythm and normal heart sounds.  Exam reveals no gallop.    No murmur heard.  Pulmonary/Chest: No respiratory distress. He has no wheezes. He has no rales.   Abdominal: Soft. Bowel sounds are normal. He exhibits no distension and no mass. There is no rebound and no guarding.   Musculoskeletal: Normal range of motion. He exhibits no edema.   Lymphadenopathy:     He has no cervical adenopathy.   Neurological: He is alert and oriented to person, place, and time.   Skin: Skin is warm and dry.   Psychiatric: He has a normal mood and affect. His behavior is normal.       Wt Readings from Last 3 Encounters:   06/23/17 99.9 kg (220 lb 3.8 oz)   05/23/17 100.9 kg (222 lb 7.1 oz)   05/03/17 101.3 kg (223 lb 5.2 oz)     Temp Readings from Last 3 Encounters:   06/23/17 97.7 °F (36.5 °C) (Oral)   05/23/17 97.4 °F (36.3 °C) (Oral)   05/03/17 97.3 °F (36.3 °C) (Tympanic)     BP Readings from Last 3 Encounters:   06/23/17 (!) 153/91   05/23/17 (!) 130/90   05/03/17 (!) 142/84     Pulse Readings from Last 3 Encounters:   06/23/17 82   05/23/17 76   05/03/17 77       Assessment:       1. Chronic deep vein thrombosis (DVT) of left lower extremity, unspecified vein    2. Chronic deep vein thrombosis (DVT) of distal vein of left lower extremity        Plan:       D-dimer is once gain normal, 4 weeks off Xarelto and 7 months from initial diagnosis  Patient says he is having persistent discomfort in the left lower extremity. Physical exam is urnemarkable.  We discussed that many patients can have the post-phlebitic syndrome     We wlll order an Us of the left  leg  See me after the test  Continue  off xarelto

## 2017-06-29 ENCOUNTER — PATIENT MESSAGE (OUTPATIENT)
Dept: HEMATOLOGY/ONCOLOGY | Facility: CLINIC | Age: 57
End: 2017-06-29

## 2017-06-30 ENCOUNTER — TELEPHONE (OUTPATIENT)
Dept: HEMATOLOGY/ONCOLOGY | Facility: CLINIC | Age: 57
End: 2017-06-30

## 2017-06-30 NOTE — TELEPHONE ENCOUNTER
----- Message from Daisha Anderson sent at 6/30/2017  1:43 PM CDT -----  Contact: Vaishnavi  Would like pt orders faxed to [phone]. Please call back at [phone]. thanks

## 2017-07-27 ENCOUNTER — DOCUMENTATION ONLY (OUTPATIENT)
Dept: HEMATOLOGY/ONCOLOGY | Facility: CLINIC | Age: 57
End: 2017-07-27

## 2017-07-27 NOTE — PROGRESS NOTES
Us of the left lower extremity done  At the [facility] on 7/6/2017 was negative for DVT

## 2017-08-16 ENCOUNTER — HOSPITAL ENCOUNTER (OUTPATIENT)
Dept: RADIOLOGY | Facility: HOSPITAL | Age: 57
Discharge: HOME | End: 2017-08-16
Attending: PHYSICIAN ASSISTANT
Payer: COMMERCIAL

## 2017-08-16 ENCOUNTER — OFFICE VISIT (OUTPATIENT)
Dept: INTERNAL MEDICINE | Facility: CLINIC | Age: 57
End: 2017-08-16
Payer: COMMERCIAL

## 2017-08-16 VITALS
DIASTOLIC BLOOD PRESSURE: 94 MMHG | HEIGHT: 70 IN | SYSTOLIC BLOOD PRESSURE: 138 MMHG | OXYGEN SATURATION: 99 % | WEIGHT: 214.5 LBS | TEMPERATURE: 97 F | HEART RATE: 92 BPM | BODY MASS INDEX: 30.71 KG/M2

## 2017-08-16 DIAGNOSIS — M25.511 ACUTE PAIN OF RIGHT SHOULDER: Primary | ICD-10-CM

## 2017-08-16 DIAGNOSIS — M25.511 ACUTE PAIN OF RIGHT SHOULDER: ICD-10-CM

## 2017-08-16 PROCEDURE — 99999 PR PBB SHADOW E&M-EST. PATIENT-LVL III: CPT | Mod: PBBFAC,,, | Performed by: PHYSICIAN ASSISTANT

## 2017-08-16 PROCEDURE — 3008F BODY MASS INDEX DOCD: CPT | Mod: S$GLB,,, | Performed by: PHYSICIAN ASSISTANT

## 2017-08-16 PROCEDURE — 73030 X-RAY EXAM OF SHOULDER: CPT | Mod: TC,PO,RT

## 2017-08-16 PROCEDURE — 99213 OFFICE O/P EST LOW 20 MIN: CPT | Mod: S$GLB,,, | Performed by: PHYSICIAN ASSISTANT

## 2017-08-16 PROCEDURE — 73030 X-RAY EXAM OF SHOULDER: CPT | Mod: 26,RT,, | Performed by: RADIOLOGY

## 2017-08-16 RX ORDER — NAPROXEN 500 MG/1
500 TABLET ORAL 2 TIMES DAILY WITH MEALS
Qty: 14 TABLET | Refills: 0 | Status: SHIPPED | OUTPATIENT
Start: 2017-08-16 | End: 2017-08-23

## 2017-08-16 NOTE — PATIENT INSTRUCTIONS
Understanding Rotator Cuff Tendonitis    Tendons are tough tissues that connect muscles to bone. A group of 4 muscles and their tendons form a cuff around the head of the upper arm bone. This is called the rotator cuff. It connects the upper arm to the shoulder blade. It gives the shoulder joint stability and strength.  If tendons are injured or strained, they may become irritated and swollen (inflamed). This is called tendonitis. Rotator cuff tendonitis may cause shoulder pain and loss of function.  What causes rotator cuff tendonitis?  Tendonitis results when the rotator cuff tendons are injured or overworked. The most common cause of injury is repetitive overhead activities. These can be work-related activities such as reaching, pushing, or lifting. Or they can be sports-related activities such as throwing, swimming, or lifting weights.  Symptoms of rotator cuff tendonitis  Pain on the side of the upper arm is the most common symptom. Pain may get worse with overhead movements or when you raise the arm above shoulder level. It may also hurt to lie on the shoulder at night.  Treatment for rotator cuff tendonitis  Treatment may include the following:  · Active rest. This lets the rotator cuff heal. Active rest means using your arm and shoulder, but avoiding activities that cause pain, such as reaching overhead or sleeping on the shoulder.  · Cold packs. Putting ice packs on the shoulder helps reduce swelling and relieve pain.  · Pain medicines. Prescription or over-the-counter pain medicines can help relieve pain and swelling.  · Arm and shoulder exercises. These help keep the shoulder joint mobile as it heals. They also help improve the strength of muscles around the joint.  Possible complications  It might be tempting to stop using your shoulder completely to avoid pain. But doing so may lead to a condition called frozen shoulder. To help prevent this, following instructions you are given for active rest  and for doing exercises to help your shoulder heal.  When to call your healthcare provider  Call your healthcare provider right away if you have any of these:  · Fever of 100.4°F (38°C) or higher, or as directed  · Symptoms that dont get better, or get worse  · New symptoms   Date Last Reviewed: 3/10/2016  © 8146-9192 [employer]. [address] [place] [address]. All rights reserved. This information is not intended as a substitute for professional medical care. Always follow your healthcare professional's instructions.        Shoulder Problems  Arthritis, injury, bone disease, and torn muscles and tendons can cause pain, stiffness, and sometimes swelling in your shoulder. Then even simple movements become painful and difficult.    Osteoarthritis  Osteoarthritis is a wearing away of your joint. Your cartilage becomes cracked and pitted, and your socket may wear down. Eventually, your bone is exposed and may develop growths called spurs. Without a cushion of cartilage, your joint becomes stiff and painful. It may feel as if its grinding or slipping out of place when you move your arm.    Inflammatory (rheumatoid) arthritis  Inflammatory arthritis is a chronic joint disease. Your synovium (the membrane that lines your joints) thickens. It then forms a tissue growth (pannus) that clings to your cartilage and releases chemicals that destroy it. Your joint may become red, swollen, and warm. Pain may radiate into your neck and arm. Over time, your joint may get stiff and your muscles may weaken from disuse. Your bone may also be destroyed.     Fracture  A fracture can occur when you fall on an outstretched hand or elbow. The ball and/or tuberosities can break off, leaving your arm bone in pieces. A fractured shoulder is painful and may be black and blue and look deformed.    Avascular necrosis  A number of conditions, including long-term use of steroids or alcohol, can cause the blood supply  to your bone to be cut off. As the bone dies, it collapses. Your shoulder becomes painful and movement is limited.    Rotator cuff tear  A chronic rotator cuff tear may lead to severe arthritis. As the ball rides up against your acromion, your joint becomes painful, stiff, and weak. Surgery can relieve the pain, but you may never regain flexibility and strength.  Date Last Reviewed: 9/26/2015  © 9231-1483 [employer]. [address] [place] [address], [place] [address]. All rights reserved. This information is not intended as a substitute for professional medical care. Always follow your healthcare professional's instructions.

## 2017-08-16 NOTE — PROGRESS NOTES
Subjective:      Patient ID: Myles Huynh is a 56 y.o. male.    Chief Complaint: Shoulder Pain (right ) and Arm Pain (right)    Shoulder Pain    The pain is present in the right shoulder. This is a new problem. The current episode started more than 1 month ago. The problem occurs constantly. The problem has been gradually worsening. The quality of the pain is described as sharp. The pain is at a severity of 5/10. The pain is moderate. Pertinent negatives include no fever, headaches, inability to bear weight, itching, joint locking, joint swelling, limited range of motion, numbness, stiffness, tingling or visual symptoms. Exacerbated by: worse towards the end of the day, wakes him up at night. He has tried nothing for the symptoms.   Pt reports that he carries his heavy briefcase on his right shoulder daily.     Review of Systems   Constitutional: Negative for activity change, appetite change, chills, diaphoresis, fatigue, fever and unexpected weight change.   HENT: Negative.  Negative for congestion, hearing loss, postnasal drip, rhinorrhea, sore throat, trouble swallowing and voice change.    Eyes: Negative.  Negative for visual disturbance.   Respiratory: Negative.  Negative for cough, choking, chest tightness and shortness of breath.    Cardiovascular: Negative for chest pain, palpitations and leg swelling.   Gastrointestinal: Negative for abdominal distention, abdominal pain, blood in stool, constipation, diarrhea, nausea and vomiting.   Endocrine: Negative for cold intolerance, heat intolerance, polydipsia and polyuria.   Genitourinary: Negative.  Negative for difficulty urinating and frequency.   Musculoskeletal: Positive for arthralgias and myalgias. Negative for back pain, gait problem, joint swelling, neck pain, neck stiffness and stiffness.   Skin: Negative for color change, itching, pallor, rash and wound.   Neurological: Negative for dizziness, tingling, tremors, weakness, light-headedness,  "numbness and headaches.   Hematological: Negative for adenopathy.   Psychiatric/Behavioral: Negative for behavioral problems, confusion, self-injury, sleep disturbance and suicidal ideas. The patient is not nervous/anxious.      Objective:   BP (!) 138/94   Pulse 92   Temp 97.1 °F (36.2 °C) (Tympanic)   Ht 5' 10" (1.778 m)   Wt 97.3 kg (214 lb 8.1 oz)   SpO2 99%   BMI 30.78 kg/m²     Physical Exam   Constitutional: He appears well-developed and well-nourished. No distress.   HENT:   Head: Normocephalic and atraumatic.   Cardiovascular: Normal rate and regular rhythm.  Exam reveals no gallop and no friction rub.    No murmur heard.  Pulmonary/Chest: Effort normal and breath sounds normal. No respiratory distress. He has no wheezes. He has no rales.   Musculoskeletal:        Right shoulder: He exhibits tenderness and pain. He exhibits normal range of motion, no bony tenderness, no swelling, no effusion, no crepitus, no deformity, no laceration, no spasm, normal pulse and normal strength.        Right upper arm: Normal. He exhibits no tenderness, no bony tenderness, no swelling, no edema, no deformity and no laceration.        Right forearm: Normal. He exhibits no tenderness, no bony tenderness, no swelling, no edema, no deformity and no laceration.        Arms:  Skin: He is not diaphoretic.   Vitals reviewed.      Assessment:     1. Acute pain of right shoulder      Plan:   Acute pain of right shoulder  -     X-ray Shoulder 2 or More Views Right; Future; Expected date: 08/16/2017  -     naproxen (NAPROSYN) 500 MG tablet; Take 1 tablet (500 mg total) by mouth 2 (two) times daily with meals.  Dispense: 14 tablet; Refill: 0    -RICE  -carry briefcase on left shoulder  -Educational handout on over-the-counter medications and at-home conservative care, pertinent to the patients diagnosis today, was handed to the patient and discussed in detail.      Return if symptoms worsen or fail to improve.      "

## 2017-08-22 ENCOUNTER — PATIENT MESSAGE (OUTPATIENT)
Dept: INTERNAL MEDICINE | Facility: CLINIC | Age: 57
End: 2017-08-22

## 2017-08-22 ENCOUNTER — TELEPHONE (OUTPATIENT)
Dept: INTERNAL MEDICINE | Facility: CLINIC | Age: 57
End: 2017-08-22

## 2017-08-22 DIAGNOSIS — M25.511 ACUTE PAIN OF RIGHT SHOULDER: Primary | ICD-10-CM

## 2017-08-22 NOTE — TELEPHONE ENCOUNTER
----- Message from Annmarie Page PA-C sent at 8/22/2017  8:49 AM CDT -----  Please schedule him an apt with ortho. I put in a referral

## 2017-08-23 ENCOUNTER — PATIENT MESSAGE (OUTPATIENT)
Dept: INTERNAL MEDICINE | Facility: CLINIC | Age: 57
End: 2017-08-23

## 2017-09-12 ENCOUNTER — PATIENT MESSAGE (OUTPATIENT)
Dept: INTERNAL MEDICINE | Facility: CLINIC | Age: 57
End: 2017-09-12

## 2017-09-13 ENCOUNTER — TELEPHONE (OUTPATIENT)
Dept: INTERNAL MEDICINE | Facility: CLINIC | Age: 57
End: 2017-09-13

## 2017-09-13 NOTE — TELEPHONE ENCOUNTER
----- Message from Amanda Ruano sent at 9/13/2017 11:18 AM CDT -----  Contact: Dr Akbar, Fabi espinosa  Ms Fabi is returning a call, Ms Dunlap states that nurse needs to contact medical records for the information needed. Thank you

## 2017-09-13 NOTE — TELEPHONE ENCOUNTER
Left message with Dr. Kalie MD-[facility] office ([phone]) to inquire about test results and progress notes from previous visit. Fax number provided in voice message. Awaiting records and return call from office.//ddw

## 2017-09-14 ENCOUNTER — PATIENT MESSAGE (OUTPATIENT)
Dept: INTERNAL MEDICINE | Facility: CLINIC | Age: 57
End: 2017-09-14

## 2017-09-28 ENCOUNTER — PATIENT MESSAGE (OUTPATIENT)
Dept: INTERNAL MEDICINE | Facility: CLINIC | Age: 57
End: 2017-09-28

## 2017-10-09 ENCOUNTER — TELEPHONE (OUTPATIENT)
Dept: INTERNAL MEDICINE | Facility: CLINIC | Age: 57
End: 2017-10-09

## 2017-10-09 DIAGNOSIS — M19.019 AC JOINT ARTHROPATHY: Primary | ICD-10-CM

## 2017-10-09 DIAGNOSIS — M75.111 PARTIAL TEAR OF RIGHT ROTATOR CUFF: ICD-10-CM

## 2017-10-09 NOTE — TELEPHONE ENCOUNTER
Spoke with patient. States after seeing last doctor he feels he doesn't need a second opinion at this time. States he may try physical therapy. Please see message sent to you this morning from patient.//ddw

## 2017-10-09 NOTE — TELEPHONE ENCOUNTER
----- Message from Annmarie Page PA-C sent at 10/9/2017  9:06 AM CDT -----  I put in a referral for him to see Dr. Jay for a second opinion on his right shoulder rotator cuff tear and pain. Please see if we can get him in to see Dr. Jay.

## 2017-11-03 ENCOUNTER — PATIENT MESSAGE (OUTPATIENT)
Dept: INTERNAL MEDICINE | Facility: CLINIC | Age: 57
End: 2017-11-03

## 2017-11-06 ENCOUNTER — OFFICE VISIT (OUTPATIENT)
Dept: INTERNAL MEDICINE | Facility: CLINIC | Age: 57
End: 2017-11-06
Payer: COMMERCIAL

## 2017-11-06 VITALS
OXYGEN SATURATION: 97 % | HEART RATE: 91 BPM | BODY MASS INDEX: 30.55 KG/M2 | DIASTOLIC BLOOD PRESSURE: 93 MMHG | SYSTOLIC BLOOD PRESSURE: 151 MMHG | HEIGHT: 70 IN | WEIGHT: 213.38 LBS | TEMPERATURE: 98 F

## 2017-11-06 DIAGNOSIS — Z01.818 PREOP EXAMINATION: ICD-10-CM

## 2017-11-06 DIAGNOSIS — M19.011 ARTHROPATHY OF RIGHT SHOULDER: Primary | ICD-10-CM

## 2017-11-06 PROCEDURE — 99213 OFFICE O/P EST LOW 20 MIN: CPT | Mod: S$GLB,,, | Performed by: PHYSICIAN ASSISTANT

## 2017-11-06 PROCEDURE — 99999 PR PBB SHADOW E&M-EST. PATIENT-LVL III: CPT | Mod: PBBFAC,,, | Performed by: PHYSICIAN ASSISTANT

## 2017-11-06 NOTE — PATIENT INSTRUCTIONS
Step-by-Step  Checking Your Blood Pressure    Date Last Reviewed: 4/27/2016  © 8002-2385 [employer]. [address] [place] [address], [place] [address]. All rights reserved. This information is not intended as a substitute for professional medical care. Always follow your healthcare professional's instructions.        Controlling High Blood Pressure  High blood pressure (hypertension) is often called the silent killer. This is because many people who have it dont know it. High blood pressure is defined as 140/90 mm Hg or higher. Know your blood pressure and remember to check it regularly. Doing so can save your life. Here are some things you can do to help control your blood pressure.    Choose heart-healthy foods  · Select low-salt, low-fat foods. Limit sodium intake to 2,400 mg per day or the amount suggested by your healthcare provider.  · Limit canned, dried, cured, packaged, and fast foods. These can contain a lot of salt.  · Eat 8 to 10 servings of fruits and vegetables every day.  · Choose lean meats, fish, or chicken.  · Eat whole-grain pasta, brown rice, and beans.  · Eat 2 to 3 servings of low-fat or fat-free dairy products.  · Ask your doctor about the DASH eating plan. This plan helps reduce blood pressure.  · When you go to a restaurant, ask that your meal be prepared with no added salt.  Maintain a healthy weight  · Ask your healthcare provider how many calories to eat a day. Then stick to that number.  · Ask your healthcare provider what weight range is healthiest for you. If you are overweight, a weight loss of only 3% to 5% of your body weight can help lower blood pressure. Generally, a good weight loss goal is to lose 10% of your body weight in a year.  · Limit snacks and sweets.  · Get regular exercise.  Get up and get active  · Choose activities you enjoy. Find ones you can do with friends or family. This includes bicycling, dancing, walking, and jogging.  · Park farther away from  building entrances.  · Use stairs instead of the elevator.  · When you can, walk or bike instead of driving.  · [place] leaves, garden, or do household repairs.  · Be active at a moderate to vigorous level of physical activity for at least 40 minutes for a minimum of 3 to 4 days a week.   Manage stress  · Make time to relax and enjoy life. Find time to laugh.  · Communicate your concerns with your loved ones and your healthcare provider.  · Visit with family and friends, and keep up with hobbies.  Limit alcohol and quit smoking  · Men should have no more than 2 drinks per day.  · Women should have no more than 1 drink per day.  · Talk with your healthcare provider about quitting smoking. Smoking significantly increases your risk for heart disease and stroke. Ask your healthcare provider about community smoking cessation programs and other options.  Medicines  If lifestyle changes arent enough, your healthcare provider may prescribe high blood pressure medicine. Take all medicines as prescribed. If you have any questions about your medicines, ask your healthcare provider before stopping or changing them.   Date Last Reviewed: 4/27/2016 © 2000-2017 [employer]. [address] [place] [address]. All rights reserved. This information is not intended as a substitute for professional medical care. Always follow your healthcare professional's instructions.        What is High Blood Pressure?  High blood pressure (also called hypertension) is known as the silent killer. This is because most of the time it doesnt cause symptoms. In fact, many people dont know they have it until other problems develop. In most cases, high blood pressure cant be cured. Its a disease that often requires lifelong treatment. The good news is that it can be managed.  Understanding blood pressure  The circulatory system is made up of the heart and blood vessels that carry blood through the body. Your heart is the pump  for this system. With each heartbeat (contraction), the heart sends blood out through large blood vessels called arteries. Blood pressure is a measure of how hard the moving blood pushes against the walls of the arteries.  High blood pressure can harm your health  In a healthy blood vessel, the blood moves smoothly through the vessel and puts normal pressure on the vessel walls.       High blood pressure occurs when blood pushes too hard against artery walls. This causes damage to the artery walls and then the formation of scar tissue as it heals. This makes the arteries stiff and weak. Plaque sticks to the scarred tissue narrowing and hardening the arteries. High blood pressure also causes your heart to work harder to get blood out to the body. High blood pressure raises your risk of heart attack, also known as acute myocardial infarction, or AMI, and stroke. It can also lead to kidney disease, and blindness.  Measuring blood pressure  An example of a blood pressure measurement is 120/70 (120 over 70). The top number is the pressure of blood against the artery walls during a heartbeat (systolic). The bottom number is the pressure of blood against artery walls between heartbeats (diastolic). Talk with your healthcare provider to find out what your blood pressure goals should be.   Controlling blood pressure  If your blood pressure is too high, work with your doctor on a plan for lowering it. Below are steps you can take that will help lower your blood pressure.  · Choose heart-healthy foods. Eating healthier meals helps you control your blood pressure. Ask your doctor about the DASH eating plan. This plan helps reduce blood pressure by limiting the amount of sodium (salt) you have in your diet. DASH also encourages eating plenty of fruits and vegetables, low-fat or non-fat dairy, whole-grains, and foods high in fiber, and low in fat.  · Reduce sodium. Reducing sodium in your diet reduces fluid retention. Fluid  "retention caused by too much salt increases blood volume and blood pressure. The American Heart Associations (AHA) "ideal" sodium intake recommendation is 1,500 milligrams per day.  However, since American's eat so much salt, the AHA says a positive change can occur by cutting back to even 2,400 milligrams of sodium a day.  · Maintain a healthy weight. Being overweight makes you more likely to have high blood pressure. Losing excess weight helps lower blood pressure.  · Exercise regularly. Daily exercise helps your heart and blood vessels work better and stay healthier. It can help lower your blood pressure.  · Stop smoking. Smoking increases blood pressure and damages blood vessels.  · Limit alcohol. Drinking too much alcohol can raise blood pressure. Men should have no more than 2 drinks a day. Women should have no more than 1. (A drink is equal to 1 beer, or a small glass of wine, or a shot of liquor.)  · Control stress. Stress makes your heart work harder and beat faster. Controlling stress helps you control your blood pressure.  Facts about high blood pressure  · Feeling OK does not mean that blood pressure is under control. Likewise, feeling bad doesnt mean its out of control. The only way to know for sure is to check your pressure regularly.  · Medicine is only one part of controlling high blood pressure. You also need to manage your weight, get regular exercise, and adjust your eating habits.  · High blood pressure is usually a lifelong problem. But it can be controlled with healthy lifestyle changes and medicine.  · Hypertension is not the same as stress. Although stress may be a factor in high blood pressure, its only one part of the story.  · Blood pressure medicines need to be taken every day. Stopping suddenly may cause a dangerous increase in pressure.   Date Last Reviewed: 4/27/2016  © 0960-7064 [employer]. [address] [place] [address], [place] [address]. All rights reserved. This " information is not intended as a substitute for professional medical care. Always follow your healthcare professional's instructions.

## 2017-11-06 NOTE — PROGRESS NOTES
Subjective:      Patient ID: Myles Huynh is a 56 y.o. male.    Chief Complaint: Pre-op Exam    HPI  The patient is here for a preop clearance to have right shoulder arthroscopy surgery.  The physician that is performing the surgery is Dr. Jade at [facility].     The surgery has not been scheduled yet. Anticipated first week of December.   I will send a copy of the referral to the surgeon at fax # [phone]  The patient states that there has been previous problems with sedation. During colonoscopy had choking when coming out with anesthesia when was administered propofol. Has had it since then and did not have any issues.     Kidney disease, stroke, MI, arrhythmia, seizures, angina, asthma, diabetes, chest pain, thyroid disease, blood/bleeding disorders?  None reported. Did have a blood clot earlier this year. Etiology unknown. Took xarelto for 6 months. All testing was negative for bleeding disorders.     Patient Active Problem List   Diagnosis    External hemorrhoid    IBS (irritable bowel syndrome)    History of colon polyps    Acute deep vein thrombosis (DVT) of popliteal vein of left lower extremity    Chronic deep vein thrombosis (DVT) of distal vein of left lower extremity     Past Surgical History:   Procedure Laterality Date    APPENDECTOMY      COLONOSCOPY W/ POLYPECTOMY         Review of Systems   Constitutional: Negative for activity change, appetite change, chills, diaphoresis, fatigue, fever and unexpected weight change.   HENT: Negative.  Negative for congestion, hearing loss, postnasal drip, rhinorrhea, sore throat, trouble swallowing and voice change.    Eyes: Negative.  Negative for discharge and visual disturbance.   Respiratory: Negative.  Negative for cough, choking, chest tightness, shortness of breath and wheezing.    Cardiovascular: Negative for chest pain, palpitations and leg swelling.   Gastrointestinal: Negative for abdominal distention, abdominal pain, blood in  "stool, constipation, diarrhea, nausea and vomiting.   Endocrine: Negative for cold intolerance, heat intolerance, polydipsia and polyuria.   Genitourinary: Negative.  Negative for difficulty urinating, frequency, hematuria and urgency.   Musculoskeletal: Positive for arthralgias. Negative for back pain, gait problem, joint swelling, myalgias and neck pain.   Skin: Negative for color change, pallor, rash and wound.   Neurological: Negative for dizziness, tremors, weakness, light-headedness, numbness and headaches.   Hematological: Negative for adenopathy.   Psychiatric/Behavioral: Negative for behavioral problems, confusion, dysphoric mood, self-injury, sleep disturbance and suicidal ideas. The patient is not nervous/anxious.      Objective:   BP (!) 151/93   Pulse 91   Temp 97.9 °F (36.6 °C) (Oral)   Ht 5' 10" (1.778 m)   Wt 96.8 kg (213 lb 6.5 oz)   SpO2 97%   BMI 30.62 kg/m²     Physical Exam   Constitutional: He is oriented to person, place, and time. He appears well-developed and well-nourished.   HENT:   Head: Normocephalic and atraumatic.   Right Ear: External ear normal.   Left Ear: External ear normal.   Nose: Nose normal.   Mouth/Throat: Oropharynx is clear and moist.   Eyes: Conjunctivae and EOM are normal. Pupils are equal, round, and reactive to light.   Neck: Normal range of motion. Neck supple.   Cardiovascular: Normal rate, regular rhythm and normal heart sounds.  Exam reveals no gallop and no friction rub.    No murmur heard.  Pulmonary/Chest: Effort normal and breath sounds normal. No respiratory distress. He has no wheezes. He has no rales. He exhibits no tenderness.   Abdominal: Soft. He exhibits no distension. There is no tenderness.   Musculoskeletal: Normal range of motion.   Lymphadenopathy:     He has no cervical adenopathy.   Neurological: He is alert and oriented to person, place, and time.   Skin: Skin is warm and dry.   Psychiatric: He has a normal mood and affect. His behavior is " normal. Judgment and thought content normal.   Vitals reviewed.      Assessment:     1. Arthropathy of right shoulder    2. Preop examination      Plan:   Arthropathy of right shoulder    Preop examination  -     CBC auto differential; Future; Expected date: 11/20/2017  -     Basic metabolic panel; Future; Expected date: 11/20/2017  -     X-Ray Chest PA And Lateral; Future; Expected date: 11/20/2017  -     EKG 12-lead; Future; Expected date: 11/20/2017    -will schedule patient a follow up to recheck blood pressure. Advised to monitor at home.  -pt expresses some concerns about his upcoming surgery. Advised pt that he needs to weight risks to benefits and discuss further with his surgeon.   -also advised patient to discuss his issues with propofol with his anesthesiologist prior to his surgery date.   -advise to use appropriate DVT prophylaxis    Return if symptoms worsen or fail to improve.

## 2017-11-10 ENCOUNTER — CLINICAL SUPPORT (OUTPATIENT)
Dept: CARDIOLOGY | Facility: CLINIC | Age: 57
End: 2017-11-10
Payer: COMMERCIAL

## 2017-11-10 ENCOUNTER — OFFICE VISIT (OUTPATIENT)
Dept: INTERNAL MEDICINE | Facility: CLINIC | Age: 57
End: 2017-11-10
Payer: COMMERCIAL

## 2017-11-10 ENCOUNTER — HOSPITAL ENCOUNTER (OUTPATIENT)
Dept: RADIOLOGY | Facility: HOSPITAL | Age: 57
Discharge: HOME | End: 2017-11-10
Attending: INTERNAL MEDICINE
Payer: COMMERCIAL

## 2017-11-10 VITALS
SYSTOLIC BLOOD PRESSURE: 142 MMHG | HEART RATE: 85 BPM | OXYGEN SATURATION: 97 % | DIASTOLIC BLOOD PRESSURE: 90 MMHG | TEMPERATURE: 98 F | WEIGHT: 213.19 LBS | BODY MASS INDEX: 30.52 KG/M2 | HEIGHT: 70 IN

## 2017-11-10 DIAGNOSIS — Z23 NEED FOR INFLUENZA VACCINATION: ICD-10-CM

## 2017-11-10 DIAGNOSIS — I10 HYPERTENSION, UNSPECIFIED TYPE: Primary | ICD-10-CM

## 2017-11-10 DIAGNOSIS — K58.9 IRRITABLE BOWEL SYNDROME, UNSPECIFIED TYPE: ICD-10-CM

## 2017-11-10 DIAGNOSIS — Z86.718 HISTORY OF DVT (DEEP VEIN THROMBOSIS): ICD-10-CM

## 2017-11-10 DIAGNOSIS — Z01.818 PREOP EXAMINATION: ICD-10-CM

## 2017-11-10 PROCEDURE — 90471 IMMUNIZATION ADMIN: CPT | Mod: S$GLB,,, | Performed by: INTERNAL MEDICINE

## 2017-11-10 PROCEDURE — 71020 XR CHEST PA AND LATERAL: CPT | Mod: TC,PO

## 2017-11-10 PROCEDURE — 71020 XR CHEST PA AND LATERAL: CPT | Mod: 26,,, | Performed by: RADIOLOGY

## 2017-11-10 PROCEDURE — 99999 PR PBB SHADOW E&M-EST. PATIENT-LVL III: CPT | Mod: PBBFAC,,, | Performed by: INTERNAL MEDICINE

## 2017-11-10 PROCEDURE — 93000 ELECTROCARDIOGRAM COMPLETE: CPT | Mod: S$GLB,,, | Performed by: INTERNAL MEDICINE

## 2017-11-10 PROCEDURE — 99214 OFFICE O/P EST MOD 30 MIN: CPT | Mod: 25,S$GLB,, | Performed by: INTERNAL MEDICINE

## 2017-11-10 PROCEDURE — 90686 IIV4 VACC NO PRSV 0.5 ML IM: CPT | Mod: S$GLB,,, | Performed by: INTERNAL MEDICINE

## 2017-11-10 RX ORDER — HYDROGEN PEROXIDE 3 %
20 SOLUTION, NON-ORAL MISCELLANEOUS
COMMUNITY

## 2017-11-10 RX ORDER — HYDROCHLOROTHIAZIDE 12.5 MG/1
12.5 CAPSULE ORAL DAILY
Qty: 90 CAPSULE | Refills: 0 | Status: SHIPPED | OUTPATIENT
Start: 2017-11-10 | End: 2017-12-06 | Stop reason: SDUPTHER

## 2017-11-10 NOTE — PROGRESS NOTES
Subjective:      Patient ID: Myles Huynh is a 56 y.o. male.    Chief Complaint: Pre-op Exam      57 yo with Patient Active Problem List:     External hemorrhoid     IBS (irritable bowel syndrome)     History of colon polyps    History of DVT    Here today for preop exam for right shoulder arthroscopy. He is feeling well and in his usual state of health.   Able to walk 4 flights of stairs without cp and dyspnea. Cad, chf, ra, ckd, cva, dm.     Past Surgical History:  No date: APPENDECTOMY  No date: COLONOSCOPY W/ POLYPECTOMY          Social History     Social History    Marital status:      Spouse name: N/A    Number of children: N/A    Years of education: N/A     Occupational History     [employer]     Social History Main Topics    Smoking status: Never Smoker    Smokeless tobacco: Never Used    Alcohol use Yes      Comment: social    Drug use: No    Sexual activity: Not on file     Other Topics Concern    Not on file     Social History Narrative    .      family history includes Colon cancer (age of onset: 40) in his brother; Diabetes in his brother; No Known Problems in his father and mother.   No h/o of bleeding or clotting d/o.    Review of Systems   Constitutional: Negative for activity change and unexpected weight change.   HENT: Negative for hearing loss, rhinorrhea and trouble swallowing.    Eyes: Negative for discharge and visual disturbance.   Respiratory: Negative for chest tightness and wheezing.    Cardiovascular: Negative for chest pain, palpitations and leg swelling.   Gastrointestinal: Negative for blood in stool, constipation, diarrhea and vomiting.   Endocrine: Negative for polydipsia and polyuria.   Genitourinary: Negative for difficulty urinating, hematuria and urgency.   Musculoskeletal: Negative for arthralgias, joint swelling and neck pain.   Neurological: Negative for weakness and headaches.   Psychiatric/Behavioral: Negative for confusion and dysphoric mood.         Lab Visit on 11/10/2017   Component Date Value Ref Range Status    WBC 11/11/2017 7.68  3.90 - 12.70 K/uL Final    RBC 11/11/2017 4.47* 4.60 - 6.20 M/uL Final    Hemoglobin 11/11/2017 14.8  14.0 - 18.0 g/dL Final    Hematocrit 11/11/2017 45.0  40.0 - 54.0 % Final    MCV 11/11/2017 101* 82 - 98 fL Final    MCH 11/11/2017 33.1* 27.0 - 31.0 pg Final    MCHC 11/11/2017 32.9  32.0 - 36.0 g/dL Final    RDW 11/11/2017 13.4  11.5 - 14.5 % Final    Platelets 11/11/2017 284  150 - 350 K/uL Final    MPV 11/11/2017 11.3  9.2 - 12.9 fL Final    Immature Granulocytes 11/11/2017 0.9* 0.0 - 0.5 % Final    Gran # 11/11/2017 3.8  1.8 - 7.7 K/uL Final    Immature Grans (Abs) 11/11/2017 0.07* 0.00 - 0.04 K/uL Final    Lymph # 11/11/2017 2.6  1.0 - 4.8 K/uL Final    Mono # 11/11/2017 0.7  0.3 - 1.0 K/uL Final    Eos # 11/11/2017 0.4  0.0 - 0.5 K/uL Final    Baso # 11/11/2017 0.08  0.00 - 0.20 K/uL Final    nRBC 11/11/2017 0  0 /100 WBC Final    Gran% 11/11/2017 50.1  38.0 - 73.0 % Final    Lymph% 11/11/2017 33.7  18.0 - 48.0 % Final    Mono% 11/11/2017 9.6  4.0 - 15.0 % Final    Eosinophil% 11/11/2017 4.7  0.0 - 8.0 % Final    Basophil% 11/11/2017 1.0  0.0 - 1.9 % Final    Differential Method 11/11/2017 Automated   Final    Sodium 11/11/2017 140  136 - 145 mmol/L Final    Potassium 11/11/2017 4.2  3.5 - 5.1 mmol/L Final    Chloride 11/11/2017 106  95 - 110 mmol/L Final    CO2 11/11/2017 26  23 - 29 mmol/L Final    Glucose 11/11/2017 76  70 - 110 mg/dL Final    BUN, Bld 11/11/2017 15  6 - 20 mg/dL Final    Creatinine 11/11/2017 1.0  0.5 - 1.4 mg/dL Final    Calcium 11/11/2017 9.3  8.7 - 10.5 mg/dL Final    Anion Gap 11/11/2017 8  8 - 16 mmol/L Final    eGFR if African American 11/11/2017 >60.0  >60 mL/min/1.73 m^2 Final    eGFR if non African American 11/11/2017 >60.0  >60 mL/min/1.73 m^2 Final    Comment: Calculation used to obtain the estimated glomerular filtration  rate (eGFR) is the CKD-EPI  "equation.        EKG 12-lead   Order: [id number]   Status:  Final result   Visible to patient:  Yes (Patient Portal) Next appt:  None Dx:  Preop examination   Narrative     Test Reason : Z01.818  Blood Pressure :  mmHG  Vent. Rate : 069 BPM     Atrial Rate : 069 BPM     P-R Int : 164 ms          QRS Dur : 092 ms      QT Int : 386 ms       P-R-T Axes : 018 -18 -09 degrees     QTc Int : 413 ms    Normal sinus rhythm  Incomplete right bundle branch block  Borderline Abnormal ECG  When compared with ECG of 06-NOV-2016 13:19,  No significant change was found  Confirmed by CRESENCIO THOMPSON, MERCY HYDE ([id number]) on 11/11/2017 3:44:15 PM    Referred By: KATERYNA STEPHENSON           Confirmed By:MERCY DUPONT MD      Specimen Collected: 11/10/17 16:07 Last Resulted: 11/11/17 15:44              Narrative     Comparison: CT 11/06/2016    2 views    Findings:    Heart and pulmonary vasculature are within normal limits and trachea midline.  Smooth elevation RIGHT hemidiaphragm.  CP angles are clear.  No consolidation.  Osteopenia and minimal spondylosis.   Impression           No acute infiltrate.          Objective:   BP (!) 142/90 (BP Location: Right arm, Patient Position: Sitting)   Pulse 85   Temp 97.5 °F (36.4 °C) (Oral)   Ht 5' 10" (1.778 m)   Wt 96.7 kg (213 lb 3 oz)   SpO2 97%   BMI 30.59 kg/m²     Physical Exam   Constitutional: He is oriented to person, place, and time. He appears well-developed and well-nourished. No distress.   HENT:   Head: Normocephalic and atraumatic.   Mouth/Throat: Oropharynx is clear and moist.   Eyes: EOM are normal. Pupils are equal, round, and reactive to light.   Neck: Neck supple. Carotid bruit is not present. No thyromegaly present.   Cardiovascular: Normal rate and regular rhythm.    Pulmonary/Chest: Breath sounds normal. He has no wheezes. He has no rales.   Abdominal: Soft. Bowel sounds are normal. There is no tenderness.   Musculoskeletal: He exhibits no edema or tenderness. "   Lymphadenopathy:     He has no cervical adenopathy.   Neurological: He is alert and oriented to person, place, and time.   Skin: Skin is warm and dry.   Psychiatric: He has a normal mood and affect. His behavior is normal.       Assessment:     1. Hypertension, unspecified type    2. Irritable bowel syndrome, unspecified type    3. Preop examination    4. History of DVT (deep vein thrombosis)    5. Need for influenza vaccination      Plan:   Hypertension, unspecified type  start  -     hydroCHLOROthiazide (MICROZIDE) 12.5 mg capsule; Take 1 capsule (12.5 mg total) by mouth once daily.  Dispense: 90 capsule; Refill: 0  Notify me of bp readings in one week.    Irritable bowel syndrome, unspecified type    Preop examination  -     CBC auto differential; Future; Expected date: 11/10/2017  -     EKG 12-lead; Future; Expected date: 11/10/2017  -     Basic metabolic panel; Future; Expected date: 11/24/2017  -     X-Ray Chest PA And Lateral; Future; Expected date: 11/10/2017    History of DVT (deep vein thrombosis)  Dx November 2016. Therapy with xarelto completed as per hematology  hypercoag w/u was negative  Venous US negative 5/2017  D dimer neg June 2017    Need for influenza vaccination    Other orders  -     Influenza - Quadrivalent (3 years & older) (PF)    preop note to be completed once labs resulted    Preop addendum:  Mr. Huynh is at low risk for a perioperative cardiac event for his shoulder surgery.     Lab Frequency Next Occurrence   US Lower Extremity Veins Left Once 05/15/2017   US Lower Extremity Veins Left Once 05/23/2017   US Lower Extremity Veins Left Once 06/23/2017   CBC auto differential Once 11/10/2017   EKG 12-lead Once 11/10/2017   Basic metabolic panel Once 11/24/2017   X-Ray Chest PA And Lateral Once 11/10/2017       Problem List Items Addressed This Visit        GI    IBS (irritable bowel syndrome)      Other Visit Diagnoses     Hypertension, unspecified type    -  Primary    Relevant  Medications    hydroCHLOROthiazide (MICROZIDE) 12.5 mg capsule    Preop examination        Relevant Orders    CBC auto differential    EKG 12-lead    Basic metabolic panel    X-Ray Chest PA And Lateral (Completed)    History of DVT (deep vein thrombosis)        Need for influenza vaccination              Return if symptoms worsen or fail to improve.

## 2017-11-14 ENCOUNTER — PATIENT MESSAGE (OUTPATIENT)
Dept: INTERNAL MEDICINE | Facility: CLINIC | Age: 57
End: 2017-11-14

## 2017-11-16 ENCOUNTER — TELEPHONE (OUTPATIENT)
Dept: INTERNAL MEDICINE | Facility: CLINIC | Age: 57
End: 2017-11-16

## 2017-11-16 NOTE — TELEPHONE ENCOUNTER
Patient called regarding pre-op note/ labs to be sent to Dr. Jade at [facility]. Informed pt that Dr. Mills has not finished the note or resulted the labs yet but will fax over once done.

## 2017-11-16 NOTE — TELEPHONE ENCOUNTER
----- Message from Wolf Cheng sent at 11/16/2017  4:21 PM CST -----  Contact: Pt  Please give pt a call at [phone] regarding the pre-op paperwork.

## 2017-11-20 NOTE — TELEPHONE ENCOUNTER
Please send copy of my last PN, labs, ekg, cxr and med list to ortho at [facility] [address] and let pt know when completed.

## 2017-11-29 ENCOUNTER — PATIENT MESSAGE (OUTPATIENT)
Dept: INTERNAL MEDICINE | Facility: CLINIC | Age: 57
End: 2017-11-29

## 2017-11-29 DIAGNOSIS — Z86.718 HISTORY OF DVT (DEEP VEIN THROMBOSIS): ICD-10-CM

## 2017-11-29 DIAGNOSIS — M79.605 PAIN OF LEFT LOWER EXTREMITY: Primary | ICD-10-CM

## 2017-12-01 ENCOUNTER — HOSPITAL ENCOUNTER (OUTPATIENT)
Dept: RADIOLOGY | Facility: HOSPITAL | Age: 57
Discharge: HOME | End: 2017-12-01
Attending: INTERNAL MEDICINE
Payer: COMMERCIAL

## 2017-12-01 DIAGNOSIS — Z86.718 HISTORY OF DVT (DEEP VEIN THROMBOSIS): ICD-10-CM

## 2017-12-01 DIAGNOSIS — M79.605 PAIN OF LEFT LOWER EXTREMITY: ICD-10-CM

## 2017-12-01 PROCEDURE — 93971 EXTREMITY STUDY: CPT | Mod: 26,,, | Performed by: RADIOLOGY

## 2017-12-01 PROCEDURE — 93971 EXTREMITY STUDY: CPT | Mod: TC,PO

## 2017-12-06 DIAGNOSIS — I10 HYPERTENSION, UNSPECIFIED TYPE: ICD-10-CM

## 2017-12-06 RX ORDER — HYDROCHLOROTHIAZIDE 12.5 MG/1
12.5 CAPSULE ORAL DAILY
Qty: 90 CAPSULE | Refills: 0 | Status: SHIPPED | OUTPATIENT
Start: 2017-12-06 | End: 2017-12-08 | Stop reason: SDUPTHER

## 2017-12-08 ENCOUNTER — TELEPHONE (OUTPATIENT)
Dept: PHARMACY | Facility: CLINIC | Age: 57
End: 2017-12-08

## 2017-12-08 DIAGNOSIS — I10 HYPERTENSION, UNSPECIFIED TYPE: ICD-10-CM

## 2017-12-08 RX ORDER — HYDROCHLOROTHIAZIDE 12.5 MG/1
12.5 CAPSULE ORAL DAILY
Qty: 90 CAPSULE | Refills: 0 | Status: SHIPPED | OUTPATIENT
Start: 2017-12-08 | End: 2018-03-27 | Stop reason: SDUPTHER

## 2017-12-08 NOTE — TELEPHONE ENCOUNTER
Patient's wife called to refill his HCTZ 12.5 MG capsules (starting wed 12/6/17 until 1/5/18).    The prescription has been cancelled in [facility].     Patient was to receive the medication through [facility], however he has not received the medication.    Please send a prescription to [facility] for the patient, so that he can obtain medication.

## 2017-12-08 NOTE — TELEPHONE ENCOUNTER
Called and spoke with patient.    [facility] is processing refill now and will ship out.    Insurance did issue a 14 day override at the retail level for patient to obtain locally at no charge.    Patient very thankful, as he is down to one tablet.

## 2018-02-23 ENCOUNTER — OFFICE VISIT (OUTPATIENT)
Dept: INTERNAL MEDICINE | Facility: CLINIC | Age: 58
End: 2018-02-23
Payer: COMMERCIAL

## 2018-02-23 VITALS
WEIGHT: 212.94 LBS | OXYGEN SATURATION: 98 % | TEMPERATURE: 100 F | DIASTOLIC BLOOD PRESSURE: 88 MMHG | SYSTOLIC BLOOD PRESSURE: 132 MMHG | HEIGHT: 70 IN | HEART RATE: 111 BPM | BODY MASS INDEX: 30.49 KG/M2

## 2018-02-23 DIAGNOSIS — R68.89 FLU-LIKE SYMPTOMS: Primary | ICD-10-CM

## 2018-02-23 PROCEDURE — 99214 OFFICE O/P EST MOD 30 MIN: CPT | Mod: S$GLB,,, | Performed by: NURSE PRACTITIONER

## 2018-02-23 PROCEDURE — 99999 PR PBB SHADOW E&M-EST. PATIENT-LVL III: CPT | Mod: PBBFAC,,, | Performed by: NURSE PRACTITIONER

## 2018-02-23 PROCEDURE — 3008F BODY MASS INDEX DOCD: CPT | Mod: S$GLB,,, | Performed by: NURSE PRACTITIONER

## 2018-02-23 RX ORDER — OSELTAMIVIR PHOSPHATE 75 MG/1
75 CAPSULE ORAL 2 TIMES DAILY
Qty: 10 CAPSULE | Refills: 0 | Status: SHIPPED | OUTPATIENT
Start: 2018-02-23 | End: 2018-02-28

## 2018-02-23 NOTE — PATIENT INSTRUCTIONS

## 2018-02-23 NOTE — PROGRESS NOTES
"CC:   Chief Complaint   Patient presents with    Influenza    Diarrhea    Rhinitis    Cough    Headache    Generalized Body Aches     HPI: This is a new problem.   Myles Huynh is a 57 y.o. male with a complaint of URI.  The current episode started in the past 3 days.   The problem has been gradually worsening.   Associated symptoms included fever, chills, nasal congestion, rhinorrhea, cough, fatigue, body aches, and diarrhea.    Pertinent negatives include chest pain, hemoptysis, dyspnea, wheezing   Treatments tried: Allegra has been used and this has provided no relief.     Review of patient's allergies indicates:   Allergen Reactions    Flagyl [metronidazole] Swelling     Throat swelling       Outpatient Medications Prior to Visit   Medication Sig Dispense Refill    esomeprazole (NEXIUM) 20 MG capsule Take 20 mg by mouth before breakfast.      hydroCHLOROthiazide (MICROZIDE) 12.5 mg capsule Take 1 capsule (12.5 mg total) by mouth once daily. 90 capsule 0     No facility-administered medications prior to visit.         Physical Exam   /88   Pulse (!) 111   Temp 100 °F (37.8 °C) (Tympanic)   Ht 5' 10" (1.778 m)   Wt 96.6 kg (212 lb 15.4 oz)   SpO2 98%   BMI 30.56 kg/m²   Constitutional: The patient appears well-developed and well-nourished.   Head: Normocephalic and atraumatic.   Right Ear: Tympanic membrane and ear canal normal. No drainage, swelling or tenderness. Tympanic membrane is not injected, not erythematous and not bulging.   Left Ear: Ear canal normal. No drainage, swelling or tenderness. Tympanic membrane is not injected, not erythematous and not bulging.   Nose: Mucosal edema and rhinorrhea present.   Mouth/Throat: Uvula is midline. Posterior oropharyngeal erythema present. No oropharyngeal exudate.        THE MUCOSA IS BOGGY AND ERYTHEMATOUS.      Cardiovascular: Normal rate, regular rhythm, S1 normal, S2 normal and normal heart sounds.  Exam reveals no gallop, no S3, no " S4 and no friction rub.    No murmur heard.  Pulmonary/Chest: Effort normal and breath sounds normal. No stridor. Not tachypneic. No respiratory distress. The patient has no wheezes. The patient has no rhonchi. The patient has no rales.   Skin: The patient is not diaphoretic.     Encounter Diagnosis   Name Primary?    Flu-like symptoms Yes       PLAN:    Myles was seen today for influenza, diarrhea, rhinitis, cough, headache and generalized body aches.    Diagnoses and all orders for this visit:    Flu-like symptoms  -     oseltamivir (TAMIFLU) 75 MG capsule; Take 1 capsule (75 mg total) by mouth 2 (two) times daily.        Medications Ordered This Encounter      oseltamivir (TAMIFLU) 75 MG capsule          Sig: Take 1 capsule (75 mg total) by mouth 2 (two) times daily.          Dispense:  10 capsule          Refill:  0  No orders of the defined types were placed in this encounter.    Instructed to hydrate and rest.  Monitor fever and treat.  Did not want anything for cough.  Will continue Allegra. RTC if symptoms are worsening or changing significantly or if not improved by the end of therapy.

## 2018-03-27 ENCOUNTER — PATIENT MESSAGE (OUTPATIENT)
Dept: HEMATOLOGY/ONCOLOGY | Facility: CLINIC | Age: 58
End: 2018-03-27

## 2018-03-27 DIAGNOSIS — I10 HYPERTENSION, UNSPECIFIED TYPE: ICD-10-CM

## 2018-03-27 NOTE — TELEPHONE ENCOUNTER
----- Message from Sue Evans MA sent at 3/27/2018 12:07 PM CDT -----      ----- Message -----  From: Reno Pina MD  Sent: 3/27/2018  11:58 AM  To: Sue Evans MA        He sent me this note.  This has to come from his PCP or the doctor who prescribed it  DR Sathya Huynh would like a refill of the following medications:         hydroCHLOROthiazide (MICROZIDE) 12.5 mg capsule [Erlin Jensen MD]       Patient Comment: I have 5 pills left.  Thank you.     Preferred pharmacy: [facility] - [address]

## 2018-03-28 ENCOUNTER — PATIENT MESSAGE (OUTPATIENT)
Dept: INTERNAL MEDICINE | Facility: CLINIC | Age: 58
End: 2018-03-28

## 2018-03-28 RX ORDER — HYDROCHLOROTHIAZIDE 12.5 MG/1
12.5 CAPSULE ORAL DAILY
Qty: 90 CAPSULE | Refills: 0 | Status: SHIPPED | OUTPATIENT
Start: 2018-03-28 | End: 2018-05-10

## 2018-04-06 ENCOUNTER — OFFICE VISIT (OUTPATIENT)
Dept: INTERNAL MEDICINE | Facility: CLINIC | Age: 58
End: 2018-04-06
Payer: COMMERCIAL

## 2018-04-06 VITALS
BODY MASS INDEX: 30.27 KG/M2 | TEMPERATURE: 98 F | OXYGEN SATURATION: 97 % | WEIGHT: 211.44 LBS | DIASTOLIC BLOOD PRESSURE: 94 MMHG | HEART RATE: 102 BPM | HEIGHT: 70 IN | SYSTOLIC BLOOD PRESSURE: 136 MMHG

## 2018-04-06 DIAGNOSIS — I10 HYPERTENSION, UNSPECIFIED TYPE: ICD-10-CM

## 2018-04-06 DIAGNOSIS — M54.41 ACUTE RIGHT-SIDED LOW BACK PAIN WITH RIGHT-SIDED SCIATICA: Primary | ICD-10-CM

## 2018-04-06 DIAGNOSIS — Z00.00 PREVENTATIVE HEALTH CARE: ICD-10-CM

## 2018-04-06 PROCEDURE — 99999 PR PBB SHADOW E&M-EST. PATIENT-LVL III: CPT | Mod: PBBFAC,,, | Performed by: INTERNAL MEDICINE

## 2018-04-06 PROCEDURE — 99214 OFFICE O/P EST MOD 30 MIN: CPT | Mod: S$GLB,,, | Performed by: INTERNAL MEDICINE

## 2018-04-06 RX ORDER — CYCLOBENZAPRINE HCL 10 MG
TABLET ORAL
Qty: 30 TABLET | Refills: 0 | Status: SHIPPED | OUTPATIENT
Start: 2018-04-06 | End: 2018-05-04

## 2018-04-06 RX ORDER — GABAPENTIN 300 MG/1
300 CAPSULE ORAL NIGHTLY
Qty: 90 CAPSULE | Refills: 0 | Status: SHIPPED | OUTPATIENT
Start: 2018-04-06 | End: 2018-05-04

## 2018-04-06 RX ORDER — NAPROXEN 500 MG/1
500 TABLET ORAL 2 TIMES DAILY WITH MEALS
Qty: 30 TABLET | Refills: 0 | Status: SHIPPED | OUTPATIENT
Start: 2018-04-06 | End: 2018-05-04

## 2018-04-06 NOTE — PROGRESS NOTES
Subjective:      Patient ID: Myles Huynh is a 57 y.o. male.    Chief Complaint: Hip Pain (down to right leg)    56 yo with Patient Active Problem List:     External hemorrhoid     IBS (irritable bowel syndrome)     History of colon polyps     Chronic deep vein thrombosis (DVT) of distal vein of left lower extremity    Here today c/o back pain. No recent trauma.       Back Pain   This is a new problem. The current episode started 1 to 4 weeks ago. The problem occurs intermittently. The problem has been waxing and waning since onset. The pain is present in the gluteal (right). The quality of the pain is described as aching, burning and shooting. The pain radiates to the right foot. The pain is moderate. The pain is worse during the day. The symptoms are aggravated by standing, twisting and bending. Pertinent negatives include no abdominal pain, bladder incontinence, bowel incontinence, chest pain, dysuria, fever, headaches, numbness, paresis, pelvic pain or weakness. Risk factors include lack of exercise. He has tried nothing for the symptoms. The treatment provided no relief.     Review of Systems   Constitutional: Negative for activity change, fever and unexpected weight change.   HENT: Negative for hearing loss, rhinorrhea and trouble swallowing.    Eyes: Negative for discharge and visual disturbance.   Respiratory: Negative for chest tightness and wheezing.    Cardiovascular: Negative for chest pain and palpitations.   Gastrointestinal: Negative for abdominal pain, blood in stool, bowel incontinence, constipation, diarrhea and vomiting.   Endocrine: Negative for polydipsia and polyuria.   Genitourinary: Negative for bladder incontinence, difficulty urinating, dysuria, hematuria, pelvic pain and urgency.   Musculoskeletal: Positive for arthralgias and back pain. Negative for neck pain.   Neurological: Negative for weakness, numbness and headaches.   Psychiatric/Behavioral: Negative for confusion and  "dysphoric mood.     Objective:   BP (!) 136/94 (BP Location: Right arm, Patient Position: Sitting)   Pulse 102   Temp 98.3 °F (36.8 °C) (Tympanic)   Ht 5' 10" (1.778 m)   Wt 95.9 kg (211 lb 6.7 oz)   SpO2 97%   BMI 30.34 kg/m²     Physical Exam   Constitutional: He is oriented to person, place, and time. He appears well-developed and well-nourished. No distress.   HENT:   Head: Normocephalic and atraumatic.   Mouth/Throat: Oropharynx is clear and moist.   Eyes: EOM are normal. Pupils are equal, round, and reactive to light.   Neck: Neck supple. No thyromegaly present.   Cardiovascular: Normal rate and regular rhythm.    Pulmonary/Chest: Breath sounds normal. He has no wheezes. He has no rales.   Abdominal: Soft. Bowel sounds are normal. There is no tenderness.   Musculoskeletal: He exhibits no edema.   Lymphadenopathy:     He has no cervical adenopathy.   Neurological: He is alert and oriented to person, place, and time. He displays normal reflexes. No sensory deficit. He exhibits normal muscle tone. Coordination normal.   Skin: Skin is warm and dry.   Psychiatric: He has a normal mood and affect. His behavior is normal.       Assessment:     1. Acute right-sided low back pain with right-sided sciatica    2. Hypertension, unspecified type    3. Preventative health care      Plan:   Acute right-sided low back pain with right-sided sciatica  -     gabapentin (NEURONTIN) 300 MG capsule; Take 1 capsule (300 mg total) by mouth every evening.  Dispense: 90 capsule; Refill: 0  -     naproxen (NAPROSYN) 500 MG tablet; Take 1 tablet (500 mg total) by mouth 2 (two) times daily with meals. For pain and inflammation  Dispense: 30 tablet; Refill: 0  -     cyclobenzaprine (FLEXERIL) 10 MG tablet; 1/2 to one tab po qhs prn muscle spasm  Dispense: 30 tablet; Refill: 0    Hypertension, unspecified type  Comments:  not at goal, ? due to pain  cont to monitor     Preventative health care  -     Comprehensive metabolic panel; " Future; Expected date: 04/06/2018  -     TSH; Future; Expected date: 04/06/2018  -     PSA, Screening; Future; Expected date: 04/06/2018  -     Hemoglobin A1c; Future; Expected date: 04/06/2018  -     Lipid panel; Future; Expected date: 04/06/2018    Check with your pharmacy regarding shingles vaccine.       Lab Frequency Next Occurrence   US Lower Extremity Veins Left Once 05/23/2017   US Lower Extremity Veins Left Once 06/23/2017   Comprehensive metabolic panel Once 04/06/2018   TSH Once 04/06/2018   PSA, Screening Once 04/06/2018   Hemoglobin A1c Once 04/06/2018   Lipid panel Once 04/06/2018       Problem List Items Addressed This Visit     None      Visit Diagnoses     Acute right-sided low back pain with right-sided sciatica    -  Primary    Relevant Medications    gabapentin (NEURONTIN) 300 MG capsule    naproxen (NAPROSYN) 500 MG tablet    cyclobenzaprine (FLEXERIL) 10 MG tablet    Hypertension, unspecified type        not at goal, ? due to pain  cont to monitor     Preventative health care        Relevant Orders    Comprehensive metabolic panel    TSH    PSA, Screening    Hemoglobin A1c    Lipid panel          Follow-up in about 4 weeks (around 5/4/2018), or if symptoms worsen or fail to improve.

## 2018-04-10 ENCOUNTER — LAB VISIT (OUTPATIENT)
Dept: LAB | Facility: HOSPITAL | Age: 58
End: 2018-04-10
Attending: INTERNAL MEDICINE
Payer: COMMERCIAL

## 2018-04-10 DIAGNOSIS — Z00.00 PREVENTATIVE HEALTH CARE: ICD-10-CM

## 2018-04-10 LAB
ALBUMIN SERPL BCP-MCNC: 4 G/DL
ALP SERPL-CCNC: 74 U/L
ALT SERPL W/O P-5'-P-CCNC: 48 U/L
ANION GAP SERPL CALC-SCNC: 9 MMOL/L
AST SERPL-CCNC: 33 U/L
BILIRUB SERPL-MCNC: 0.6 MG/DL
BUN SERPL-MCNC: 10 MG/DL
CALCIUM SERPL-MCNC: 9.4 MG/DL
CHLORIDE SERPL-SCNC: 101 MMOL/L
CHOLEST SERPL-MCNC: 253 MG/DL
CHOLEST/HDLC SERPL: 4.3 {RATIO}
CO2 SERPL-SCNC: 30 MMOL/L
COMPLEXED PSA SERPL-MCNC: 2.7 NG/ML
CREAT SERPL-MCNC: 1.1 MG/DL
EST. GFR  (AFRICAN AMERICAN): >60 ML/MIN/1.73 M^2
EST. GFR  (NON AFRICAN AMERICAN): >60 ML/MIN/1.73 M^2
ESTIMATED AVG GLUCOSE: 97 MG/DL
GLUCOSE SERPL-MCNC: 95 MG/DL
HBA1C MFR BLD HPLC: 5 %
HDLC SERPL-MCNC: 59 MG/DL
HDLC SERPL: 23.3 %
LDLC SERPL CALC-MCNC: 167.2 MG/DL
NONHDLC SERPL-MCNC: 194 MG/DL
POTASSIUM SERPL-SCNC: 4.3 MMOL/L
PROT SERPL-MCNC: 6.9 G/DL
SODIUM SERPL-SCNC: 140 MMOL/L
TRIGL SERPL-MCNC: 134 MG/DL
TSH SERPL DL<=0.005 MIU/L-ACNC: 1.4 UIU/ML

## 2018-04-10 PROCEDURE — 80061 LIPID PANEL: CPT

## 2018-04-10 PROCEDURE — 84443 ASSAY THYROID STIM HORMONE: CPT

## 2018-04-10 PROCEDURE — 80053 COMPREHEN METABOLIC PANEL: CPT

## 2018-04-10 PROCEDURE — 84153 ASSAY OF PSA TOTAL: CPT

## 2018-04-10 PROCEDURE — 83036 HEMOGLOBIN GLYCOSYLATED A1C: CPT

## 2018-04-10 PROCEDURE — 36415 COLL VENOUS BLD VENIPUNCTURE: CPT | Mod: PO

## 2018-05-04 ENCOUNTER — OFFICE VISIT (OUTPATIENT)
Dept: INTERNAL MEDICINE | Facility: CLINIC | Age: 58
End: 2018-05-04
Payer: COMMERCIAL

## 2018-05-04 VITALS
TEMPERATURE: 97 F | HEART RATE: 84 BPM | OXYGEN SATURATION: 98 % | HEIGHT: 70 IN | WEIGHT: 209 LBS | SYSTOLIC BLOOD PRESSURE: 130 MMHG | BODY MASS INDEX: 29.92 KG/M2 | DIASTOLIC BLOOD PRESSURE: 86 MMHG

## 2018-05-04 DIAGNOSIS — Z00.00 ROUTINE GENERAL MEDICAL EXAMINATION AT A HEALTH CARE FACILITY: Primary | ICD-10-CM

## 2018-05-04 DIAGNOSIS — I10 HYPERTENSION, UNSPECIFIED TYPE: ICD-10-CM

## 2018-05-04 DIAGNOSIS — Z91.89 AT RISK FOR CORONARY ARTERY DISEASE: ICD-10-CM

## 2018-05-04 DIAGNOSIS — K21.9 GASTROESOPHAGEAL REFLUX DISEASE, ESOPHAGITIS PRESENCE NOT SPECIFIED: ICD-10-CM

## 2018-05-04 PROBLEM — I82.5Z2 CHRONIC DEEP VEIN THROMBOSIS (DVT) OF DISTAL VEIN OF LEFT LOWER EXTREMITY: Status: RESOLVED | Noted: 2017-06-23 | Resolved: 2018-05-04

## 2018-05-04 PROCEDURE — 99999 PR PBB SHADOW E&M-EST. PATIENT-LVL III: CPT | Mod: PBBFAC,,, | Performed by: INTERNAL MEDICINE

## 2018-05-04 PROCEDURE — 99396 PREV VISIT EST AGE 40-64: CPT | Mod: S$GLB,,, | Performed by: INTERNAL MEDICINE

## 2018-05-04 RX ORDER — VALSARTAN 80 MG/1
80 TABLET ORAL DAILY
Qty: 90 TABLET | Refills: 0 | Status: SHIPPED | OUTPATIENT
Start: 2018-05-04 | End: 2018-05-30 | Stop reason: SDUPTHER

## 2018-05-07 ENCOUNTER — TELEPHONE (OUTPATIENT)
Dept: RADIOLOGY | Facility: HOSPITAL | Age: 58
End: 2018-05-07

## 2018-05-07 NOTE — PROGRESS NOTES
Subjective:      Patient ID: Myles Huynh is a 57 y.o. male.    Chief Complaint: Follow-up    56 yo with Patient Active Problem List:     External hemorrhoid     IBS (irritable bowel syndrome)     History of colon polyps    Past Medical History:  No date: Colon polyps  No date: DVT (deep venous thrombosis)  No date: Esophageal stricture  No date: IBS (irritable bowel syndrome)    Here today for annual prev exam. Energy and appetite good.  Compliant with meds without significant side effects.     Social History    Marital status:              Spouse name:                       Years of education:                 Number of children:               Occupational History  Occupation          Employer            Comment                                   [employer]                 Social History Main Topics    Smoking status: Never Smoker                                                                Smokeless tobacco: Never Used                        Alcohol use: Yes                Comment: social    Drug use: No              Sexual activity: Not on file          Other Topics            Concern    None on file    Social History Narrative    .             Hypertension   This is a chronic problem. The current episode started more than 1 month ago. The problem is unchanged. The problem is resistant. Pertinent negatives include no anxiety, blurred vision, chest pain, headaches, malaise/fatigue, neck pain, orthopnea, palpitations, peripheral edema, PND, shortness of breath or sweats. There are no associated agents to hypertension. Risk factors for coronary artery disease include dyslipidemia. Past treatments include diuretics. The current treatment provides mild improvement. Compliance problems include exercise and medication side effects.      Review of Systems   Constitutional: Negative for chills, fever and malaise/fatigue.   HENT: Negative for ear pain and sore throat.    Eyes: Negative for blurred  "vision.   Respiratory: Negative for cough and shortness of breath.    Cardiovascular: Negative for chest pain, palpitations, orthopnea and PND.   Gastrointestinal: Negative for abdominal pain and blood in stool.   Genitourinary: Negative for dysuria and hematuria.   Musculoskeletal: Negative for neck pain.   Neurological: Negative for seizures, syncope and headaches.     Objective:   /86 (BP Location: Right arm, Patient Position: Sitting)   Pulse 84   Temp 97.3 °F (36.3 °C) (Tympanic)   Ht 5' 10" (1.778 m)   Wt 94.8 kg (208 lb 15.9 oz)   SpO2 98%   BMI 29.99 kg/m²     Physical Exam    Assessment:     1. Routine general medical examination at a health care facility    2. Hypertension, unspecified type    3. Gastroesophageal reflux disease, esophagitis presence not specified    4. At risk for coronary artery disease      Plan:   Routine general medical examination at a health care facility    Hypertension, unspecified type  -     valsartan (DIOVAN) 80 MG tablet; Take 1 tablet (80 mg total) by mouth once daily.  Dispense: 90 tablet; Refill: 0    Gastroesophageal reflux disease, esophagitis presence not specified    At risk for coronary artery disease  -     CT Cardiac Scoring; Future; Expected date: 05/04/2018    Heart healthy diet and reg exercise   reviewed      Lab Frequency Next Occurrence   US Lower Extremity Veins Left Once 05/23/2017   US Lower Extremity Veins Left Once 06/23/2017   CT Cardiac Scoring Once 05/04/2018       Problem List Items Addressed This Visit     None      Visit Diagnoses     Routine general medical examination at a health care facility    -  Primary    Hypertension, unspecified type        Relevant Medications    valsartan (DIOVAN) 80 MG tablet    Gastroesophageal reflux disease, esophagitis presence not specified        At risk for coronary artery disease        Relevant Orders    CT Cardiac Scoring          Follow-up in about 4 weeks (around 6/1/2018), or if symptoms worsen " or fail to improve.

## 2018-05-08 ENCOUNTER — HOSPITAL ENCOUNTER (OUTPATIENT)
Dept: RADIOLOGY | Facility: HOSPITAL | Age: 58
Discharge: HOME | End: 2018-05-08
Attending: INTERNAL MEDICINE
Payer: COMMERCIAL

## 2018-05-08 DIAGNOSIS — Z91.89 AT RISK FOR CORONARY ARTERY DISEASE: ICD-10-CM

## 2018-05-08 PROCEDURE — 75571 CT HRT W/O DYE W/CA TEST: CPT | Mod: TC,PO

## 2018-05-08 PROCEDURE — 75571 CT HRT W/O DYE W/CA TEST: CPT | Mod: 26,,, | Performed by: RADIOLOGY

## 2018-05-09 ENCOUNTER — PATIENT MESSAGE (OUTPATIENT)
Dept: INTERNAL MEDICINE | Facility: CLINIC | Age: 58
End: 2018-05-09

## 2018-05-09 DIAGNOSIS — Z71.89 CARDIAC RISK COUNSELING: Primary | ICD-10-CM

## 2018-05-09 NOTE — TELEPHONE ENCOUNTER
Your coronary calcium score is 25 which is in the low to moderate risk category. I would recommend that you either start a statin medication or discuss your risk further with a cardiologist. Let me know which you would prefer.

## 2018-05-09 NOTE — PROGRESS NOTES
Subjective:      Patient ID: Myles Huynh is a 57 y.o. male.    Chief Complaint: No chief complaint on file.    HPI  Review of Systems  Objective:   There were no vitals taken for this visit.    Physical Exam    Assessment:     No diagnosis found.  Plan:   There are no diagnoses linked to this encounter.    Lab Frequency Next Occurrence   US Lower Extremity Veins Left Once 05/23/2017   US Lower Extremity Veins Left Once 06/23/2017       Problem List Items Addressed This Visit     None          No Follow-up on file.

## 2018-05-09 NOTE — TELEPHONE ENCOUNTER
Your coronary calcium score reveals a Low-to-moderate coronary heart disease risk. I recommend that you talk to about statin decision.

## 2018-05-10 ENCOUNTER — PATIENT MESSAGE (OUTPATIENT)
Dept: INTERNAL MEDICINE | Facility: CLINIC | Age: 58
End: 2018-05-10

## 2018-05-11 ENCOUNTER — OFFICE VISIT (OUTPATIENT)
Dept: CARDIOLOGY | Facility: CLINIC | Age: 58
End: 2018-05-11
Payer: COMMERCIAL

## 2018-05-11 VITALS
SYSTOLIC BLOOD PRESSURE: 126 MMHG | HEIGHT: 70 IN | DIASTOLIC BLOOD PRESSURE: 82 MMHG | HEART RATE: 90 BPM | WEIGHT: 212.5 LBS | BODY MASS INDEX: 30.42 KG/M2

## 2018-05-11 DIAGNOSIS — I10 ESSENTIAL HYPERTENSION: ICD-10-CM

## 2018-05-11 DIAGNOSIS — Z86.010 HISTORY OF COLON POLYPS: ICD-10-CM

## 2018-05-11 DIAGNOSIS — E78.00 PURE HYPERCHOLESTEROLEMIA: Primary | ICD-10-CM

## 2018-05-11 DIAGNOSIS — K58.9 IRRITABLE BOWEL SYNDROME, UNSPECIFIED TYPE: ICD-10-CM

## 2018-05-11 DIAGNOSIS — R93.1 AGATSTON CORONARY ARTERY CALCIUM SCORE LESS THAN 100: ICD-10-CM

## 2018-05-11 PROCEDURE — 99204 OFFICE O/P NEW MOD 45 MIN: CPT | Mod: S$GLB,,, | Performed by: INTERNAL MEDICINE

## 2018-05-11 PROCEDURE — 99999 PR PBB SHADOW E&M-EST. PATIENT-LVL III: CPT | Mod: PBBFAC,,, | Performed by: INTERNAL MEDICINE

## 2018-05-11 RX ORDER — PRAVASTATIN SODIUM 40 MG/1
40 TABLET ORAL DAILY
Qty: 90 TABLET | Refills: 3 | Status: SHIPPED | OUTPATIENT
Start: 2018-05-11 | End: 2018-11-20 | Stop reason: SDUPTHER
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
## 2018-05-16 ENCOUNTER — TELEPHONE (OUTPATIENT)
Dept: CARDIOLOGY | Facility: CLINIC | Age: 58
End: 2018-05-16

## 2018-05-16 ENCOUNTER — CLINICAL SUPPORT (OUTPATIENT)
Dept: CARDIOLOGY | Facility: CLINIC | Age: 58
End: 2018-05-16
Attending: INTERNAL MEDICINE
Payer: COMMERCIAL

## 2018-05-16 DIAGNOSIS — E78.00 PURE HYPERCHOLESTEROLEMIA: ICD-10-CM

## 2018-05-16 DIAGNOSIS — I10 ESSENTIAL HYPERTENSION: ICD-10-CM

## 2018-05-16 DIAGNOSIS — R93.1 AGATSTON CORONARY ARTERY CALCIUM SCORE LESS THAN 100: ICD-10-CM

## 2018-05-16 LAB — DIASTOLIC DYSFUNCTION: NO

## 2018-05-16 PROCEDURE — 93015 CV STRESS TEST SUPVJ I&R: CPT | Mod: S$GLB,,, | Performed by: INTERNAL MEDICINE

## 2018-05-22 ENCOUNTER — PATIENT MESSAGE (OUTPATIENT)
Dept: CARDIOLOGY | Facility: CLINIC | Age: 58
End: 2018-05-22

## 2018-05-30 ENCOUNTER — TELEPHONE (OUTPATIENT)
Dept: CARDIOLOGY | Facility: CLINIC | Age: 58
End: 2018-05-30

## 2018-05-30 ENCOUNTER — PATIENT MESSAGE (OUTPATIENT)
Dept: CARDIOLOGY | Facility: CLINIC | Age: 58
End: 2018-05-30

## 2018-05-30 DIAGNOSIS — I10 HYPERTENSION, UNSPECIFIED TYPE: ICD-10-CM

## 2018-05-30 NOTE — TELEPHONE ENCOUNTER
"Contacted pt to r/s the echocardiogram that was cx.  Pt stated:  "cost was a surprise to me. I expected a several hundred dollar fee, not $1700. I was notified today of the pre-pay cost and that is too much right now. "      "

## 2018-05-31 RX ORDER — VALSARTAN 80 MG/1
80 TABLET ORAL DAILY
Qty: 30 TABLET | Refills: 0 | Status: SHIPPED | OUTPATIENT
Start: 2018-05-31 | End: 2018-07-23

## 2018-06-07 ENCOUNTER — PATIENT MESSAGE (OUTPATIENT)
Dept: CARDIOLOGY | Facility: CLINIC | Age: 58
End: 2018-06-07

## 2018-06-22 NOTE — TELEPHONE ENCOUNTER
Spoke with Dr. Jade's nurse states she will send over the last note from patients's chart.//[place]   Reconstitution Date (Optional): 06/22/2018

## 2018-06-28 DIAGNOSIS — I10 HYPERTENSION, UNSPECIFIED TYPE: ICD-10-CM

## 2018-06-28 RX ORDER — VALSARTAN 80 MG/1
80 TABLET ORAL DAILY
Qty: 30 TABLET | Refills: 0 | Status: SHIPPED | OUTPATIENT
Start: 2018-06-28 | End: 2018-07-23

## 2018-07-06 ENCOUNTER — PATIENT MESSAGE (OUTPATIENT)
Dept: CARDIOLOGY | Facility: CLINIC | Age: 58
End: 2018-07-06

## 2018-07-16 ENCOUNTER — PATIENT MESSAGE (OUTPATIENT)
Dept: CARDIOLOGY | Facility: CLINIC | Age: 58
End: 2018-07-16

## 2018-07-23 ENCOUNTER — TELEPHONE (OUTPATIENT)
Dept: INTERNAL MEDICINE | Facility: CLINIC | Age: 58
End: 2018-07-23

## 2018-07-23 DIAGNOSIS — I10 HYPERTENSION, UNSPECIFIED TYPE: Primary | ICD-10-CM

## 2018-07-23 RX ORDER — LOSARTAN POTASSIUM 50 MG/1
50 TABLET ORAL DAILY
Qty: 90 TABLET | Refills: 0 | Status: SHIPPED | OUTPATIENT
Start: 2018-07-23 | End: 2018-08-21 | Stop reason: SDUPTHER

## 2018-07-23 NOTE — TELEPHONE ENCOUNTER
Spoke with Eneida at [facility], she states that they have the Valsartan that was not affected by recall but patient is wanting to change medication completely as he does not feel comfortable taking Valsartan.     Please send in a new prescription to replace Valsartan. Pharmacist did not list alternatives.

## 2018-07-23 NOTE — TELEPHONE ENCOUNTER
----- Message from Herlinda Hernandez sent at 7/23/2018 10:03 AM CDT -----  Contact: family pharmacy   Requesting valsartan medication changed per jorge luis . Please call back at [phone].        [facility] - [address]  [address]  [place] [address]  Phone: [phone] Fax: [phone]    Thanks,  Herlinda Hernandez

## 2018-07-23 NOTE — TELEPHONE ENCOUNTER
Spoke with pt, notified him that Dr. Mills has sent in a prescription for Losartan. Advised to come in to clinic in one month. Pt scheduled an appointment on 8/21/18 at 3:20 pm.

## 2018-08-21 ENCOUNTER — OFFICE VISIT (OUTPATIENT)
Dept: INTERNAL MEDICINE | Facility: CLINIC | Age: 58
End: 2018-08-21
Payer: COMMERCIAL

## 2018-08-21 VITALS
TEMPERATURE: 98 F | DIASTOLIC BLOOD PRESSURE: 84 MMHG | HEIGHT: 70 IN | SYSTOLIC BLOOD PRESSURE: 134 MMHG | OXYGEN SATURATION: 98 % | WEIGHT: 213.63 LBS | HEART RATE: 80 BPM | BODY MASS INDEX: 30.58 KG/M2

## 2018-08-21 DIAGNOSIS — I10 HYPERTENSION, UNSPECIFIED TYPE: ICD-10-CM

## 2018-08-21 DIAGNOSIS — R93.1 AGATSTON CORONARY ARTERY CALCIUM SCORE LESS THAN 100: ICD-10-CM

## 2018-08-21 DIAGNOSIS — I10 ESSENTIAL HYPERTENSION: Primary | ICD-10-CM

## 2018-08-21 DIAGNOSIS — K20.0 EOSINOPHILIC ESOPHAGITIS: ICD-10-CM

## 2018-08-21 PROCEDURE — 99999 PR PBB SHADOW E&M-EST. PATIENT-LVL III: CPT | Mod: PBBFAC,,, | Performed by: INTERNAL MEDICINE

## 2018-08-21 PROCEDURE — 99213 OFFICE O/P EST LOW 20 MIN: CPT | Mod: S$GLB,,, | Performed by: INTERNAL MEDICINE

## 2018-08-21 RX ORDER — LOSARTAN POTASSIUM 100 MG/1
100 TABLET ORAL DAILY
Qty: 90 TABLET | Refills: 0 | Status: SHIPPED | OUTPATIENT
Start: 2018-08-21 | End: 2018-11-20 | Stop reason: SDUPTHER

## 2018-08-21 RX ORDER — FLUTICASONE PROPIONATE 220 UG/1
1 AEROSOL, METERED RESPIRATORY (INHALATION) 2 TIMES DAILY
COMMUNITY
Start: 2018-08-02 | End: 2018-11-13

## 2018-08-21 NOTE — PROGRESS NOTES
"Subjective:      Patient ID: Myles Huynh is a 57 y.o. male.    Chief Complaint: Follow-up    56 yo with Patient Active Problem List:     External hemorrhoid     IBS (irritable bowel syndrome)     History of colon polyps     Pure hypercholesterolemia     Agatston coronary artery calcium score less than 100     Essential hypertension     Eosinophilic esophagitis    Past Medical History:  No date: Colon polyps  No date: DVT (deep venous thrombosis)  No date: Esophageal stricture  No date: IBS (irritable bowel syndrome)  5/11/2018: Pure hypercholesterolemia    Here today for f/u on mult med problems as outlined below.  Compliant with meds without significant side effects. Energy and appetite is good. Feeling well.       Review of Systems   Constitutional: Negative for chills and fever.   HENT: Negative for ear pain and sore throat.    Respiratory: Negative for cough.    Cardiovascular: Negative for chest pain.   Gastrointestinal: Negative for abdominal pain and blood in stool.   Genitourinary: Negative for dysuria and hematuria.   Neurological: Negative for seizures and syncope.     Objective:   /84 (BP Location: Right arm, Patient Position: Sitting)   Pulse 80   Temp 97.7 °F (36.5 °C) (Tympanic)   Ht 5' 10" (1.778 m)   Wt 96.9 kg (213 lb 10 oz)   SpO2 98%   BMI 30.65 kg/m²     Physical Exam   Constitutional: He is oriented to person, place, and time. He appears well-developed and well-nourished. No distress.   HENT:   Head: Normocephalic and atraumatic.   Mouth/Throat: Oropharynx is clear and moist.   Eyes: EOM are normal. Pupils are equal, round, and reactive to light.   Neck: Neck supple. No thyromegaly present.   Cardiovascular: Normal rate and regular rhythm.   Pulmonary/Chest: Breath sounds normal. He has no wheezes. He has no rales.   Abdominal: Soft. Bowel sounds are normal. There is no tenderness.   Musculoskeletal: He exhibits no edema.   Lymphadenopathy:     He has no cervical adenopathy. "   Neurological: He is alert and oriented to person, place, and time.   Skin: Skin is warm and dry.   Psychiatric: He has a normal mood and affect. His behavior is normal.       Assessment:     1. Essential hypertension    2. Agatston coronary artery calcium score less than 100    3. Hypertension, unspecified type    4. Eosinophilic esophagitis      Plan:   Essential hypertension  Not at goal. Increase losartan    Agatston coronary artery calcium score less than 100  Cont pravastatin    Hypertension, unspecified type  -     losartan (COZAAR) 100 MG tablet; Take 1 tablet (100 mg total) by mouth once daily.  Dispense: 90 tablet; Refill: 0    Eosinophilic esophagitis  Recent dx  On fluticasone per Dr. Vega    Remember flu vaccine should be available in September or October.       Lab Frequency Next Occurrence   Comprehensive metabolic panel Once 08/09/2018   Lipid panel Once 08/09/2018       Problem List Items Addressed This Visit        Cardiac/Vascular    Agatston coronary artery calcium score less than 100    Essential hypertension - Primary       GI    Eosinophilic esophagitis      Other Visit Diagnoses     Hypertension, unspecified type        Relevant Medications    losartan (COZAAR) 100 MG tablet          Follow-up in about 6 months (around 2/21/2019), or if symptoms worsen or fail to improve.

## 2018-08-27 ENCOUNTER — TELEPHONE (OUTPATIENT)
Dept: INTERNAL MEDICINE | Facility: CLINIC | Age: 58
End: 2018-08-27

## 2018-08-27 NOTE — TELEPHONE ENCOUNTER
Spoke with Elizabeth, pharmacist at [facility], she wanted to clarify if pt should be taking Irbesartan-HCTZ or Losartan. Advised her that pt was just prescribed Losartan 100 mg. She will discontinue other rx.

## 2018-08-27 NOTE — TELEPHONE ENCOUNTER
----- Message from Brianda Escotoite sent at 8/27/2018  1:22 PM CDT -----  Contact: Emy with [facility]   Emy called and stated she needed clarity on a prescription. She can be reached at [phone] ref # [id number].    Thanks,  TF

## 2018-09-18 ENCOUNTER — INITIAL CONSULT (OUTPATIENT)
Dept: PULMONOLOGY | Facility: CLINIC | Age: 58
End: 2018-09-18
Payer: COMMERCIAL

## 2018-09-18 VITALS
SYSTOLIC BLOOD PRESSURE: 140 MMHG | DIASTOLIC BLOOD PRESSURE: 80 MMHG | HEART RATE: 110 BPM | RESPIRATION RATE: 14 BRPM | BODY MASS INDEX: 30.69 KG/M2 | WEIGHT: 214.38 LBS | OXYGEN SATURATION: 95 % | HEIGHT: 70 IN

## 2018-09-18 DIAGNOSIS — I10 ESSENTIAL HYPERTENSION: ICD-10-CM

## 2018-09-18 DIAGNOSIS — G47.33 SLEEP APNEA, OBSTRUCTIVE: Primary | ICD-10-CM

## 2018-09-18 PROCEDURE — 99999 PR PBB SHADOW E&M-EST. PATIENT-LVL III: CPT | Mod: PBBFAC,,, | Performed by: INTERNAL MEDICINE

## 2018-09-18 PROCEDURE — 99205 OFFICE O/P NEW HI 60 MIN: CPT | Mod: S$GLB,,, | Performed by: INTERNAL MEDICINE

## 2018-09-18 NOTE — PROGRESS NOTES
[facility] - Pulmonary Services  History & Physical    Subjective:      Chief Complaint/  Myles Ivett Huynh is a 57 y.o. male.  Referred by Dr Moe Stewart  [place] score 0  Comorbidity: HTN, HLD  Very pleasant 57-year-old male  by ramon  Past medical history reviewed  He tells me he had an abnormal echo done recently not present in epic, abn heart muscle relaxation  He has some snoring .  Last year he needed preop clearance for rotator cuff  surgery and it was noted that his blood pressure was elevated he was started on blood pressure medications  Is currently taking LOSARTAN  Recent EKGs showed incomplete right bundle branch pattern.  He had a stress test that was negative for ischemia however his blood pressure was noted to be elevated at the end of the study was 164/82.  He recently had a EGD by Dr. Mckeon he has eosinophilic esophagitis  He is taking Flovent  Patient does not discern any obvious daytime sleepiness.  He is happy with this sleep  Indications for testing were discussed  Based on the AASM guidelines sleep apnea that requires treatment is AHI of greater than 5 with symptoms or greater than 15 without symptoms.  Hx of DVT      STOP - BANG Questionnaire:     1. Snoring : Do you snore loudly ?    Yes    2. Tired : Do you often feel tired, fatigued, or sleepy during daytime? No    3. Observed: Has anyone observed you stop breathing during your sleep?   No     4. Blood pressure : Do you have or are you being treated for high blood pressure?   Yes    5. BMI :BMI more than 35 kg/m2?   No    6. Age : Age over 50 yr old?   Yes    7. Neck circumference: Neck circumference greater than 40 cm?   Yes    8. Gender: Gender male?   Yes    High risk of TIFFANY: Yes 5 - 8         References:   STOP Questionnaire   A Tool to Screen Patients for Obstructive Sleep Apnea: VIKTORIYA Nolan., Ángel Clarke M.B.B.S., Echo Miramontes M.D.,Emerald You, Ph.D., Jerson Stephen M.B.B.S.,_ Nakul Bravo,  M.Sc.,_ Rafael Bal M.D., GABINO TiradoR.C.P.C.; Anesthesiology 2008; 108:812-21 Copyright © 2008, the American Society of Anesthesiologists, Inc. [employer]            Past Medical History:   Diagnosis Date    Colon polyps     DVT (deep venous thrombosis)     Esophageal stricture     IBS (irritable bowel syndrome)     Pure hypercholesterolemia 5/11/2018     Past Surgical History:   Procedure Laterality Date    APPENDECTOMY      COLONOSCOPY N/A 12/15/2014    [facility]    COLONOSCOPY W/ POLYPECTOMY      MOUTH SURGERY Left 09/2018    Oral Surrgery (dental)    ROTATOR CUFF REPAIR Right 12/2017     Family History   Problem Relation Age of Onset    Diabetes Brother     Colon cancer Brother 40    No Known Problems Mother     No Known Problems Father      Social History     Tobacco Use    Smoking status: Never Smoker    Smokeless tobacco: Never Used   Substance Use Topics    Alcohol use: Yes     Comment: social    Drug use: No         (Not in a hospital admission)  Review of patient's allergies indicates:   Allergen Reactions    Flagyl [metronidazole] Swelling     Throat swelling        Review of Systems   Respiratory:        Snoring   All other systems reviewed and are negative.      Objective:      Vital Signs (Most Recent)  Pulse: 110 (09/18/18 0812)  Resp: 14 (09/18/18 0812)  BP: (!) 140/80 (09/18/18 0812)  SpO2: 95 % (09/18/18 0812)    Vital Signs Range (Last 24H):  [unfilled]    Physical Exam   Constitutional: He is oriented to person, place, and time. He appears well-developed and well-nourished.   HENT:   Head: Normocephalic and atraumatic.   Nose: Nose normal.   Mouth/Throat: Oropharynx is clear and moist.   malampati score 4   Eyes: Conjunctivae and EOM are normal. Pupils are equal, round, and reactive to light. No scleral icterus.   Neck: Normal range of motion. Neck supple. No JVD present. No tracheal deviation present.  "  Neck 17.5"   Cardiovascular: Normal rate, regular rhythm, normal heart sounds and intact distal pulses.   No murmur heard.  Pulmonary/Chest: Effort normal and breath sounds normal. No stridor. No respiratory distress. He has no wheezes. He has no rales.   Abdominal: Soft. Bowel sounds are normal.   Musculoskeletal: Normal range of motion.   Lymphadenopathy:     He has no cervical adenopathy.   Neurological: He is alert and oriented to person, place, and time.   Skin: Skin is warm and dry. Capillary refill takes 2 to 3 seconds.   Nursing note and vitals reviewed.      Data Review:    CBC:   Lab Results   Component Value Date    WBC 7.68 11/10/2017    RBC 4.47 (L) 11/10/2017    HGB 14.8 11/10/2017    HCT 45.0 11/10/2017     11/10/2017     BMP:   Lab Results   Component Value Date    GLU 95 04/10/2018     04/10/2018    K 4.3 04/10/2018     04/10/2018    CO2 30 (H) 04/10/2018    BUN 10 04/10/2018    CREATININE 1.1 04/10/2018    CALCIUM 9.4 04/10/2018      ECG: incomplete RBB    CXR reviewed   Heart and pulmonary vasculature are within normal limits and trachea midline.  Smooth elevation RIGHT hemidiaphragm.  CP angles are clear.  No consolidation.  Osteopenia and minimal spondylosis.      Impression           No acute infiltrate.          Assessment:        Problem List Items Addressed This Visit     Essential hypertension    Relevant Orders    Home Sleep Studies      Other Visit Diagnoses     Sleep apnea, obstructive    -  Primary    Relevant Orders    Home Sleep Studies          Plan:         Follow-up for Follow up Sleep Clinic after test PSG/HSAT/CPAP.    This note was prepared using voice recognition system and is likely to have sound alike errors that may have been overlooked even after proof reading.  Please call me with any questions    Discussed diagnosis, its evaluation, treatment and usual course. All questions answered.    Thank you for the courtesy of participating in the care of this " patient    Genaro Styles MD

## 2018-09-18 NOTE — PATIENT INSTRUCTIONS
"Your provider has scheduled you for a sleep study.   You should be receiving a phone call from the sleep lab shortly after your study has been approved by your insurance. Please make sure you have your current phone numbers in the [facility] system. If you do not hear from anyone in the next 10 business days, please call the sleep lab at [phone] to schedule your sleep study. The sleep studies are performed at [facility] seven nights a week.  When you are scheduling your sleep study, they will also make you a follow up appointment with your provider. This follow up appointment will be 10-14 days after your sleep study to review the results. If it is noted that you do have sleep apnea on your initial sleep study, you may receive a call back for a second night study with the CPAP before you come back to the office.       Home Sleep Tests (HSTs) have burst onto the scene as an alternative to traditional sleep studies where the patient spends the night in a lab with a sleep tech conducting the study, but not everyone will fit the criteria for a HST.  At the [facility], our Sleep Specialists will look at your medical history, sleep habits and sleep issues to decide if a HST is best for you.    Additionally, some insurance companies require a HST before they will consider an "in lab" study and some companies will not approve HSTs under any circumstances and we can help you navigate those paiz as well.    Home Sleep Study Pros:  On average, a Home Sleep Test is about one-quarter the cost of an in-lab study.      You get to be in your own home and sleep in your own bed.    They are a good diagnostic tool for generally healthy people with uncomplicated Obstructive Sleep Apnea (TIFFANY).    Home Sleep Study Cons:  If a sensor falls off, you may not know it, whereas the  would be right in to reattach it.  If a sensor is off for a good portion of the night, the study may need to be " repeated.    HSTs are limited to sleep disordered breathing and are not appropriate for patients with other conditions such as, Chronic Obstructive Pulmonary Disease (COPD), Congestive Heart Failure (CHF), Restless Leg Syndrome (RLS) and others that may necessitate more advanced monitoring.    HSTs tend to under diagnose. Because they don't use EEG electrodes to positively determine when the patient is asleep, the entire study will be scored from Start to Stop, versus an in-lab study where sleep is accurately recorded and only the sleep portion is scored.    If the Sleep Specialist determines that a HST is right for you, you will be scheduled for an appointment to  the device and be given a demonstration and instructions on how to attach and use it.    HST Devices and What Gets Recorded:  There are several brands of HST devices available and while what parameters they record may vary, all will at least include: a small nasal cannula to measure airflow, a belt around the upper chest to measure the movement of breathing and a finger clip to measure the oxygen saturation of the blood (oximeter).    Other parameters may include: a small wire (thermistor) in front of the mouth to measure oral airflow, a second belt to measure breathing movement at the waist (diaphragm) and a position sensor to record when you are on your back, right or left side or stomach.       The recording device itself is battery operated and attaches to the chest belt and is worn in front, so belly sleepers may have to adjust their position for the night.  Most are approximately the size of a smart phone and their thickness may be up to a couple inches, but all weigh less than a pound.

## 2018-09-18 NOTE — LETTER
September 18, 2018      Moe Stewart MD  [address]  [facility] [address]           [facility] - Pulmonary Services  [address] [place] [id number]  Phone: [phone]  Fax: [phone]          Patient: Myles Huynh   MR Number: [id number]   YOB: 1960   Date of Visit: 9/18/2018       Dear Dr. Moe Stewart:    Thank you for referring Myles Huynh to me for evaluation. Attached you will find relevant portions of my assessment and plan of care.    If you have questions, please do not hesitate to call me. I look forward to following Myles Huynh along with you.    Sincerely,    Genaro Styles MD    Enclosure  CC:  No Recipients    If you would like to receive this communication electronically, please contact [email or web address] or [phone] to request more information on [employer] access.    For providers and/or their staff who would like to refer a patient to [facility], please contact us through our one-stop-shop provider referral line, [facility] , at [phone].    If you feel you have received this communication in error or would no longer like to receive these types of communications, please e-mail [email or web address]

## 2018-09-26 ENCOUNTER — PATIENT MESSAGE (OUTPATIENT)
Dept: SLEEP MEDICINE | Facility: CLINIC | Age: 58
End: 2018-09-26

## 2018-09-26 ENCOUNTER — PROCEDURE VISIT (OUTPATIENT)
Dept: SLEEP MEDICINE | Facility: CLINIC | Age: 58
End: 2018-09-26
Payer: COMMERCIAL

## 2018-09-26 DIAGNOSIS — I10 ESSENTIAL HYPERTENSION: ICD-10-CM

## 2018-09-26 DIAGNOSIS — G47.33 OSA (OBSTRUCTIVE SLEEP APNEA): Primary | ICD-10-CM

## 2018-09-26 DIAGNOSIS — G47.33 SLEEP APNEA, OBSTRUCTIVE: Primary | ICD-10-CM

## 2018-09-26 PROCEDURE — 95806 SLEEP STUDY UNATT&RESP EFFT: CPT | Mod: S$GLB,,, | Performed by: INTERNAL MEDICINE

## 2018-09-26 PROCEDURE — 99499 UNLISTED E&M SERVICE: CPT | Mod: S$GLB,,, | Performed by: INTERNAL MEDICINE

## 2018-09-26 NOTE — PROGRESS NOTES
Assessment and Recommendations  Study was completed with 3 night protocol. Data on the 3rd night was adequate for analysis. Duration was 7 hr  34 min. Lowest oxygen saturation was 81% average heart rate was 78 beats per minute. Snoring was recorded  above 50 decibels 94% of the time.  Apnea-hypopnea index: AHI: 11.3 events per hour. Total events 86.  Mild obstructive sleep apnea-hypopnea syndrome  Treatment recommendations:  CPAP would be the guideline recommendation for first-line treatment for obstructive sleep apnea.  Either order in lab CPAP titration or AutoPAP device.  Follow-up evaluation and treatment in the sleep disorder clinic.  Other modalities for treatment of obstructive sleep apnea may be explored in patients with intolerant to CPAP including  evaluation by ear nose and throat, mandibular advancement device, Nasal Provent, Hypoglossal nerve stimulator ( INSPIRE)  ,nonsupine sleep positioning device. Significant weight loss is recommended to normal ranges.  Close follow-up to ensure resolution of symptoms.

## 2018-09-26 NOTE — Clinical Note
Assessment and RecommendationsStudy was completed with 3 night protocol. Data on the 3rd night was adequate for analysis. Duration was 7 hr34 min. Lowest oxygen saturation was 81% average heart rate was 78 beats per minute. Snoring was recordedabove 50 decibels 94% of the time.Apnea-hypopnea index: AHI: 11.3 events per hour. Total events 86.Mild obstructive sleep apnea-hypopnea syndromeTreatment recommendations:CPAP would be the guideline recommendation for first-line treatment for obstructive sleep apnea.Either order in lab CPAP titration or AutoPAP device.Follow-up evaluation and treatment in the sleep disorder clinic.Other modalities for treatment of obstructive sleep apnea may be explored in patients with intolerant to CPAP includingevaluation by ear nose and throat, mandibular advancement device, Nasal Provent, Hypoglossal nerve stimulator ( INSPIRE),nonsupine sleep positioning device. Significant weight loss is recommended to normal ranges.Close follow-up to ensure resolution o

## 2018-09-27 ENCOUNTER — TELEPHONE (OUTPATIENT)
Dept: PULMONOLOGY | Facility: CLINIC | Age: 58
End: 2018-09-27

## 2018-09-27 NOTE — TELEPHONE ENCOUNTER
----- Message from Genaro Styles MD sent at 9/26/2018  1:15 PM CDT -----  Schedule appt to review HSAT result    Genaro Styles MD

## 2018-10-01 ENCOUNTER — PATIENT MESSAGE (OUTPATIENT)
Dept: PULMONOLOGY | Facility: CLINIC | Age: 58
End: 2018-10-01

## 2018-10-11 ENCOUNTER — PATIENT MESSAGE (OUTPATIENT)
Dept: CARDIOLOGY | Facility: CLINIC | Age: 58
End: 2018-10-11

## 2018-10-15 ENCOUNTER — LAB VISIT (OUTPATIENT)
Dept: LAB | Facility: HOSPITAL | Age: 58
End: 2018-10-15
Attending: INTERNAL MEDICINE
Payer: COMMERCIAL

## 2018-10-15 DIAGNOSIS — E78.00 PURE HYPERCHOLESTEROLEMIA: ICD-10-CM

## 2018-10-15 DIAGNOSIS — I10 ESSENTIAL HYPERTENSION: ICD-10-CM

## 2018-10-15 LAB
ALBUMIN SERPL BCP-MCNC: 4.1 G/DL
ALP SERPL-CCNC: 83 U/L
ALT SERPL W/O P-5'-P-CCNC: 35 U/L
ANION GAP SERPL CALC-SCNC: 8 MMOL/L
AST SERPL-CCNC: 32 U/L
BILIRUB SERPL-MCNC: 0.6 MG/DL
BUN SERPL-MCNC: 12 MG/DL
CALCIUM SERPL-MCNC: 9.7 MG/DL
CHLORIDE SERPL-SCNC: 106 MMOL/L
CHOLEST SERPL-MCNC: 208 MG/DL
CHOLEST/HDLC SERPL: 3.7 {RATIO}
CO2 SERPL-SCNC: 27 MMOL/L
CREAT SERPL-MCNC: 1.2 MG/DL
EST. GFR  (AFRICAN AMERICAN): >60 ML/MIN/1.73 M^2
EST. GFR  (NON AFRICAN AMERICAN): >60 ML/MIN/1.73 M^2
GLUCOSE SERPL-MCNC: 90 MG/DL
HDLC SERPL-MCNC: 56 MG/DL
HDLC SERPL: 26.9 %
LDLC SERPL CALC-MCNC: 121.4 MG/DL
NONHDLC SERPL-MCNC: 152 MG/DL
POTASSIUM SERPL-SCNC: 5 MMOL/L
PROT SERPL-MCNC: 7.1 G/DL
SODIUM SERPL-SCNC: 141 MMOL/L
TRIGL SERPL-MCNC: 153 MG/DL

## 2018-10-15 PROCEDURE — 80061 LIPID PANEL: CPT

## 2018-10-15 PROCEDURE — 36415 COLL VENOUS BLD VENIPUNCTURE: CPT | Mod: PO

## 2018-10-15 PROCEDURE — 80053 COMPREHEN METABOLIC PANEL: CPT

## 2018-10-17 ENCOUNTER — OFFICE VISIT (OUTPATIENT)
Dept: CARDIOLOGY | Facility: CLINIC | Age: 58
End: 2018-10-17
Payer: COMMERCIAL

## 2018-10-17 ENCOUNTER — PATIENT MESSAGE (OUTPATIENT)
Dept: PULMONOLOGY | Facility: CLINIC | Age: 58
End: 2018-10-17

## 2018-10-17 ENCOUNTER — CLINICAL SUPPORT (OUTPATIENT)
Dept: CARDIOLOGY | Facility: CLINIC | Age: 58
End: 2018-10-17
Payer: COMMERCIAL

## 2018-10-17 VITALS
HEART RATE: 78 BPM | HEIGHT: 70 IN | BODY MASS INDEX: 30.96 KG/M2 | WEIGHT: 216.25 LBS | DIASTOLIC BLOOD PRESSURE: 82 MMHG | SYSTOLIC BLOOD PRESSURE: 124 MMHG

## 2018-10-17 DIAGNOSIS — R93.1 AGATSTON CORONARY ARTERY CALCIUM SCORE LESS THAN 100: Primary | ICD-10-CM

## 2018-10-17 DIAGNOSIS — I82.5Z2 CHRONIC DEEP VEIN THROMBOSIS (DVT) OF DISTAL VEIN OF LEFT LOWER EXTREMITY: ICD-10-CM

## 2018-10-17 DIAGNOSIS — I10 ESSENTIAL HYPERTENSION: Primary | ICD-10-CM

## 2018-10-17 DIAGNOSIS — I10 ESSENTIAL HYPERTENSION: ICD-10-CM

## 2018-10-17 DIAGNOSIS — E78.00 PURE HYPERCHOLESTEROLEMIA: ICD-10-CM

## 2018-10-17 PROCEDURE — 99213 OFFICE O/P EST LOW 20 MIN: CPT | Mod: S$GLB,,, | Performed by: INTERNAL MEDICINE

## 2018-10-17 PROCEDURE — 99999 PR PBB SHADOW E&M-EST. PATIENT-LVL III: CPT | Mod: PBBFAC,,, | Performed by: INTERNAL MEDICINE

## 2018-10-17 RX ORDER — ASPIRIN 81 MG/1
81 TABLET ORAL DAILY
Qty: 30 TABLET | Refills: 6 | COMMUNITY
Start: 2018-10-17

## 2018-10-17 NOTE — PROGRESS NOTES
Subjective:   Patient ID:  Myles Huynh is a 57 y.o. male who presents for follow up of Follow-up and Numbness (left leg)      HPI  A 56 yo male with ca score 25 sleep apnea hlp is here for f/u he is feeling better with autopap he s breathing betetr feels more fresh in the morning no  Sleepiness or fatigue. Has no chest pain his ett was ok echo from general was ok. His cholest dropped to 203 ldl 121 . He ahs no new complaints otherwise. He is trying to exercise  He neds to lose more weight.  Past Medical History:   Diagnosis Date    Colon polyps     DVT (deep venous thrombosis)     Esophageal stricture     IBS (irritable bowel syndrome)     Pure hypercholesterolemia 5/11/2018       Past Surgical History:   Procedure Laterality Date    APPENDECTOMY      COLONOSCOPY N/A 12/15/2014    Performed by Verito Dan MD at [facility]    COLONOSCOPY W/ POLYPECTOMY      MOUTH SURGERY Left 09/2018    Oral Surrgery (dental)    ROTATOR CUFF REPAIR Right 12/2017       Social History     Tobacco Use    Smoking status: Never Smoker    Smokeless tobacco: Never Used   Substance Use Topics    Alcohol use: Yes     Comment: social    Drug use: No       Family History   Problem Relation Age of Onset    Diabetes Brother     Colon cancer Brother 40    No Known Problems Mother     No Known Problems Father        Current Outpatient Medications   Medication Sig    esomeprazole (NEXIUM) 20 MG capsule Take 20 mg by mouth before breakfast.    FLOVENT  mcg/actuation inhaler Inhale 1 puff into the lungs 2 (two) times daily.    losartan (COZAAR) 100 MG tablet Take 1 tablet (100 mg total) by mouth once daily.    pravastatin (PRAVACHOL) 40 MG tablet Take 1 tablet (40 mg total) by mouth once daily.     No current facility-administered medications for this visit.      Current Outpatient Medications on File Prior to Visit   Medication Sig    esomeprazole (NEXIUM) 20 MG capsule Take 20 mg by mouth before breakfast.     FLOVENT  mcg/actuation inhaler Inhale 1 puff into the lungs 2 (two) times daily.    losartan (COZAAR) 100 MG tablet Take 1 tablet (100 mg total) by mouth once daily.    pravastatin (PRAVACHOL) 40 MG tablet Take 1 tablet (40 mg total) by mouth once daily.     No current facility-administered medications on file prior to visit.        Review of Systems   Constitution: Negative for weakness and malaise/fatigue.   Eyes: Negative for blurred vision.   Cardiovascular: Negative for chest pain, claudication, cyanosis, dyspnea on exertion, irregular heartbeat, leg swelling, near-syncope, orthopnea, palpitations and paroxysmal nocturnal dyspnea.   Respiratory: Negative for cough, hemoptysis and shortness of breath. Snoring: improved.    Hematologic/Lymphatic: Negative for bleeding problem. Does not bruise/bleed easily.   Skin: Negative for dry skin and itching.   Musculoskeletal: Negative for falls, muscle weakness and myalgias.   Gastrointestinal: Negative for abdominal pain, diarrhea, heartburn, hematemesis, hematochezia and melena.   Genitourinary: Negative for flank pain and hematuria.   Neurological: Negative for dizziness, focal weakness, headaches, light-headedness, numbness, paresthesias and seizures.   Psychiatric/Behavioral: Negative for altered mental status and memory loss. The patient is not nervous/anxious.    Allergic/Immunologic: Negative for hives.       Objective:   Physical Exam   Constitutional: He is oriented to person, place, and time. He appears well-developed and well-nourished. No distress.   HENT:   Head: Normocephalic and atraumatic.   Eyes: EOM are normal. Pupils are equal, round, and reactive to light. Right eye exhibits no discharge. Left eye exhibits no discharge.   Neck: Neck supple. No JVD present. No thyromegaly present.   Cardiovascular: Normal rate, regular rhythm, normal heart sounds and intact distal pulses. Exam reveals no gallop and no friction rub.   No murmur  "heard.  Pulmonary/Chest: Effort normal and breath sounds normal. No respiratory distress. He has no wheezes. He has no rales. He exhibits no tenderness.   Abdominal: Soft. Bowel sounds are normal. He exhibits no distension. There is no tenderness.   Obese.   Musculoskeletal: Normal range of motion. He exhibits no edema.   Neurological: He is alert and oriented to person, place, and time. No cranial nerve deficit.   Skin: Skin is warm and dry. No rash noted. He is not diaphoretic. No erythema.   Psychiatric: He has a normal mood and affect. His behavior is normal.   Nursing note and vitals reviewed.    Vitals:    10/17/18 1423 10/17/18 1429   BP: 118/72 124/82   BP Location: Right arm Left arm   Patient Position: Sitting Sitting   BP Method: Large (Manual) Large (Manual)   Pulse: 78    Weight: 98.1 kg (216 lb 4.3 oz)    Height: 5' 10" (1.778 m)      Lab Results   Component Value Date    CHOL 208 (H) 10/15/2018    CHOL 253 (H) 04/10/2018     Lab Results   Component Value Date    HDL 56 10/15/2018    HDL 59 04/10/2018     Lab Results   Component Value Date    LDLCALC 121.4 10/15/2018    LDLCALC 167.2 (H) 04/10/2018     Lab Results   Component Value Date    TRIG 153 (H) 10/15/2018    TRIG 134 04/10/2018     Lab Results   Component Value Date    CHOLHDL 26.9 10/15/2018    CHOLHDL 23.3 04/10/2018       Chemistry        Component Value Date/Time     10/15/2018 0910    K 5.0 10/15/2018 0910     10/15/2018 0910    CO2 27 10/15/2018 0910    BUN 12 10/15/2018 0910    CREATININE 1.2 10/15/2018 0910    GLU 90 10/15/2018 0910        Component Value Date/Time    CALCIUM 9.7 10/15/2018 0910    ALKPHOS 83 10/15/2018 0910    AST 32 10/15/2018 0910    ALT 35 10/15/2018 0910    BILITOT 0.6 10/15/2018 0910    ESTGFRAFRICA >60.0 10/15/2018 0910    EGFRNONAA >60.0 10/15/2018 0910          Lab Results   Component Value Date    TSH 1.395 04/10/2018     No results found for: INR, PROTIME  Lab Results   Component Value Date    WBC " 7.68 11/10/2017    HGB 14.8 11/10/2017    HCT 45.0 11/10/2017     (H) 11/10/2017     11/10/2017     BMP  Sodium   Date Value Ref Range Status   10/15/2018 141 136 - 145 mmol/L Final     Potassium   Date Value Ref Range Status   10/15/2018 5.0 3.5 - 5.1 mmol/L Final     Chloride   Date Value Ref Range Status   10/15/2018 106 95 - 110 mmol/L Final     CO2   Date Value Ref Range Status   10/15/2018 27 23 - 29 mmol/L Final     BUN, Bld   Date Value Ref Range Status   10/15/2018 12 6 - 20 mg/dL Final     Creatinine   Date Value Ref Range Status   10/15/2018 1.2 0.5 - 1.4 mg/dL Final     Calcium   Date Value Ref Range Status   10/15/2018 9.7 8.7 - 10.5 mg/dL Final     Anion Gap   Date Value Ref Range Status   10/15/2018 8 8 - 16 mmol/L Final     eGFR if    Date Value Ref Range Status   10/15/2018 >60.0 >60 mL/min/1.73 m^2 Final     eGFR if non    Date Value Ref Range Status   10/15/2018 >60.0 >60 mL/min/1.73 m^2 Final     Comment:     Calculation used to obtain the estimated glomerular filtration  rate (eGFR) is the CKD-EPI equation.        Estimated Creatinine Clearance: 79.7 mL/min (based on SCr of 1.2 mg/dL).    Assessment:     1. [place]atston coronary artery calcium score less than 100    2. Essential hypertension    3. Pure hypercholesterolemia    4. Chronic deep vein thrombosis (DVT) of distal vein of left lower extremity      Stable clinically has no angina lipids Improved clinically   Will add asa ec 81 mg po daily he needs to exercise and lose  Weight.  Plan:   Continue current therapy  Cardiac low salt diet.  Risk factor modification and excercise program.  F/u in 6 months with lipid cmp   Add as a ec 81 mg po daily.

## 2018-10-25 ENCOUNTER — PATIENT MESSAGE (OUTPATIENT)
Dept: SLEEP MEDICINE | Facility: CLINIC | Age: 58
End: 2018-10-25

## 2018-10-30 ENCOUNTER — PATIENT MESSAGE (OUTPATIENT)
Dept: PULMONOLOGY | Facility: CLINIC | Age: 58
End: 2018-10-30

## 2018-10-31 ENCOUNTER — PATIENT MESSAGE (OUTPATIENT)
Dept: SLEEP MEDICINE | Facility: CLINIC | Age: 58
End: 2018-10-31

## 2018-11-13 ENCOUNTER — TELEPHONE (OUTPATIENT)
Dept: INTERNAL MEDICINE | Facility: CLINIC | Age: 58
End: 2018-11-13

## 2018-11-13 ENCOUNTER — OFFICE VISIT (OUTPATIENT)
Dept: INTERNAL MEDICINE | Facility: CLINIC | Age: 58
End: 2018-11-13
Payer: COMMERCIAL

## 2018-11-13 ENCOUNTER — HOSPITAL ENCOUNTER (OUTPATIENT)
Dept: RADIOLOGY | Facility: HOSPITAL | Age: 58
Discharge: HOME | End: 2018-11-13
Attending: INTERNAL MEDICINE
Payer: COMMERCIAL

## 2018-11-13 ENCOUNTER — PATIENT MESSAGE (OUTPATIENT)
Dept: INTERNAL MEDICINE | Facility: CLINIC | Age: 58
End: 2018-11-13

## 2018-11-13 VITALS
SYSTOLIC BLOOD PRESSURE: 130 MMHG | OXYGEN SATURATION: 99 % | TEMPERATURE: 98 F | DIASTOLIC BLOOD PRESSURE: 84 MMHG | BODY MASS INDEX: 31.41 KG/M2 | WEIGHT: 218.94 LBS | HEART RATE: 68 BPM

## 2018-11-13 DIAGNOSIS — Z86.718 HISTORY OF DVT (DEEP VEIN THROMBOSIS): ICD-10-CM

## 2018-11-13 DIAGNOSIS — M79.605 PAIN OF LEFT LOWER EXTREMITY: Primary | ICD-10-CM

## 2018-11-13 DIAGNOSIS — Z23 NEED FOR INFLUENZA VACCINATION: ICD-10-CM

## 2018-11-13 DIAGNOSIS — M79.605 PAIN OF LEFT LOWER EXTREMITY: ICD-10-CM

## 2018-11-13 PROCEDURE — 99214 OFFICE O/P EST MOD 30 MIN: CPT | Mod: 25,S$GLB,, | Performed by: INTERNAL MEDICINE

## 2018-11-13 PROCEDURE — 93971 EXTREMITY STUDY: CPT | Mod: 26,,, | Performed by: RADIOLOGY

## 2018-11-13 PROCEDURE — 90471 IMMUNIZATION ADMIN: CPT | Mod: S$GLB,,, | Performed by: INTERNAL MEDICINE

## 2018-11-13 PROCEDURE — 90686 IIV4 VACC NO PRSV 0.5 ML IM: CPT | Mod: S$GLB,,, | Performed by: INTERNAL MEDICINE

## 2018-11-13 PROCEDURE — 93971 EXTREMITY STUDY: CPT | Mod: TC,PO

## 2018-11-13 PROCEDURE — 99999 PR PBB SHADOW E&M-EST. PATIENT-LVL III: CPT | Mod: PBBFAC,,, | Performed by: INTERNAL MEDICINE

## 2018-11-13 RX ORDER — AMOXICILLIN 500 MG/1
CAPSULE ORAL
COMMUNITY
Start: 2018-09-20 | End: 2018-11-13 | Stop reason: ALTCHOICE

## 2018-11-13 RX ORDER — NAPROXEN 500 MG/1
500 TABLET ORAL 2 TIMES DAILY WITH MEALS
Qty: 30 TABLET | Refills: 0 | Status: SHIPPED | OUTPATIENT
Start: 2018-11-13 | End: 2019-04-17

## 2018-11-13 NOTE — PROGRESS NOTES
Subjective:      Patient ID: Myles Huynh is a 57 y.o. male.    Chief Complaint: Leg Pain (left) and Numbness    HPI   56 yo with   Patient Active Problem List   Diagnosis    External hemorrhoid    IBS (irritable bowel syndrome)    History of colon polyps    Pure hypercholesterolemia    Agatston coronary artery calcium score less than 100    Essential hypertension    Eosinophilic esophagitis     Past Medical History:   Diagnosis Date    Colon polyps     DVT (deep venous thrombosis)     Esophageal stricture     IBS (irritable bowel syndrome)     Pure hypercholesterolemia 5/11/2018     Here today c/o left leg pain.   3 to 4 weeks of aching pain and numbness to left thigh. Lateral and posterior aspect. Sharp lateral thigh pain while walking and sitting this past weekend. Has had recent air travel. Denies trauma. No bowel or bladder incont. No weakness.   Review of Systems   Constitutional: Negative for chills and fever.   HENT: Negative for ear pain and sore throat.    Respiratory: Negative for cough.    Cardiovascular: Negative for chest pain.   Gastrointestinal: Negative for abdominal pain and blood in stool.   Genitourinary: Negative for dysuria and hematuria.   Skin: Negative for rash and wound.   Neurological: Negative for seizures and syncope.     Objective:   /84 (BP Location: Right arm, Patient Position: Sitting)   Pulse 68   Temp 98 °F (36.7 °C) (Tympanic)   Wt 99.3 kg (218 lb 14.7 oz)   SpO2 99%   BMI 31.41 kg/m²     Physical Exam   Constitutional: He is oriented to person, place, and time. He appears well-developed and well-nourished. No distress.   HENT:   Head: Normocephalic and atraumatic.   Mouth/Throat: Oropharynx is clear and moist.   Eyes: EOM are normal. Pupils are equal, round, and reactive to light.   Neck: Neck supple. No thyromegaly present.   Cardiovascular: Normal rate and regular rhythm.   Pulmonary/Chest: Breath sounds normal. He has no wheezes. He has no rales.    Abdominal: Soft. Bowel sounds are normal. There is no tenderness.   Musculoskeletal: He exhibits no edema.        Right hip: He exhibits normal range of motion, normal strength and no tenderness.        Left hip: He exhibits normal range of motion, normal strength, no tenderness and no bony tenderness.        Lumbar back: He exhibits normal range of motion, no tenderness, no bony tenderness, no swelling and no edema.        Legs:  Lymphadenopathy:     He has no cervical adenopathy.   Neurological: He is alert and oriented to person, place, and time. He displays normal reflexes. He exhibits normal muscle tone. Coordination normal.   Skin: Skin is warm and dry.   Psychiatric: He has a normal mood and affect. His behavior is normal.       LLE ultrasound neg for dvt    Assessment:     1. Pain of left lower extremity    2. History of DVT (deep vein thrombosis)    3. Need for influenza vaccination      Plan:   Pain of left lower extremity  -     US Lower Extremity Veins Left; Future; Expected date: 11/13/2018  -     naproxen (NAPROSYN) 500 MG tablet; Take 1 tablet (500 mg total) by mouth 2 (two) times daily with meals. For pain and inflammation  Dispense: 30 tablet; Refill: 0    History of DVT (deep vein thrombosis)  -     US Lower Extremity Veins Left; Future; Expected date: 11/13/2018  -     Influenza - Quadrivalent (3 years & older) (PF)    Need for influenza vaccination  -     Influenza - Quadrivalent (3 years & older) (PF)        Lab Frequency Next Occurrence   Comprehensive metabolic panel Once 04/18/2019   Lipid panel Once 04/18/2019       Problem List Items Addressed This Visit     None      Visit Diagnoses     Pain of left lower extremity    -  Primary    Relevant Medications    naproxen (NAPROSYN) 500 MG tablet    Other Relevant Orders    US Lower Extremity Veins Left (Completed)    History of DVT (deep vein thrombosis)        Relevant Orders    US Lower Extremity Veins Left (Completed)    Influenza -  Quadrivalent (3 years & older) (PF) (Completed)    Need for influenza vaccination        Relevant Orders    Influenza - Quadrivalent (3 years & older) (PF) (Completed)          Follow-up if symptoms worsen or fail to improve.

## 2018-11-20 DIAGNOSIS — I10 ESSENTIAL HYPERTENSION: ICD-10-CM

## 2018-11-20 DIAGNOSIS — E78.00 PURE HYPERCHOLESTEROLEMIA: ICD-10-CM

## 2018-11-20 DIAGNOSIS — I10 HYPERTENSION, UNSPECIFIED TYPE: ICD-10-CM

## 2018-11-20 DIAGNOSIS — R93.1 AGATSTON CORONARY ARTERY CALCIUM SCORE LESS THAN 100: ICD-10-CM

## 2018-11-20 RX ORDER — LOSARTAN POTASSIUM 100 MG/1
100 TABLET ORAL DAILY
Qty: 90 TABLET | Refills: 0 | Status: SHIPPED | OUTPATIENT
Start: 2018-11-20 | End: 2019-02-11 | Stop reason: SDUPTHER

## 2018-11-20 RX ORDER — PRAVASTATIN SODIUM 40 MG/1
40 TABLET ORAL DAILY
Qty: 90 TABLET | Refills: 3 | Status: SHIPPED | OUTPATIENT
Start: 2018-11-20 | End: 2019-04-17 | Stop reason: ALTCHOICE

## 2018-11-27 ENCOUNTER — PATIENT MESSAGE (OUTPATIENT)
Dept: INTERNAL MEDICINE | Facility: CLINIC | Age: 58
End: 2018-11-27

## 2018-11-28 ENCOUNTER — PATIENT MESSAGE (OUTPATIENT)
Dept: INTERNAL MEDICINE | Facility: CLINIC | Age: 58
End: 2018-11-28

## 2018-11-28 DIAGNOSIS — M79.605 LEFT LEG PAIN: Primary | ICD-10-CM

## 2018-11-28 DIAGNOSIS — M79.604 PAIN OF RIGHT LOWER EXTREMITY: ICD-10-CM

## 2018-11-29 ENCOUNTER — OFFICE VISIT (OUTPATIENT)
Dept: SLEEP MEDICINE | Facility: CLINIC | Age: 58
End: 2018-11-29
Payer: COMMERCIAL

## 2018-11-29 VITALS
OXYGEN SATURATION: 98 % | HEART RATE: 72 BPM | BODY MASS INDEX: 32.03 KG/M2 | WEIGHT: 223.75 LBS | SYSTOLIC BLOOD PRESSURE: 124 MMHG | DIASTOLIC BLOOD PRESSURE: 80 MMHG | RESPIRATION RATE: 18 BRPM | HEIGHT: 70 IN

## 2018-11-29 DIAGNOSIS — G47.33 OSA ON CPAP: ICD-10-CM

## 2018-11-29 PROCEDURE — 99214 OFFICE O/P EST MOD 30 MIN: CPT | Mod: S$GLB,,, | Performed by: INTERNAL MEDICINE

## 2018-11-29 PROCEDURE — 99999 PR PBB SHADOW E&M-EST. PATIENT-LVL IV: CPT | Mod: PBBFAC,,, | Performed by: INTERNAL MEDICINE

## 2018-11-29 NOTE — PROGRESS NOTES
Subjective:       Myles Huynh is a 58 y.o. male   Follow-up to review sleep study   Initially referred to me by Cardiology to evaluate obstructive sleep apnea  Patient has AHI 11.3 events per hour.    Patient was started on auto PAP device.    Patient is using the device and benefits from it   [place] score 3.    Download data reviewed         Previous Report(s) Reviewed: historical medical records     The following portions of the patient's history were reviewed and updated as appropriate:   He  has a past medical history of Colon polyps, DVT (deep venous thrombosis), Esophageal stricture, IBS (irritable bowel syndrome), and Pure hypercholesterolemia (5/11/2018).  He does not have any pertinent problems on file.  He  has a past surgical history that includes Appendectomy; Colonoscopy w/ polypectomy; Rotator cuff repair (Right, 12/2017); Mouth surgery (Left, 09/2018); and COLONOSCOPY (N/A, 12/15/2014).  His family history includes Colon cancer (age of onset: 40) in his brother; Diabetes in his brother; No Known Problems in his father and mother.  He  reports that  has never smoked. he has never used smokeless tobacco. He reports that he drinks alcohol. He reports that he does not use drugs.  He has a current medication list which includes the following prescription(s): aspirin, esomeprazole, losartan, pravastatin, and naproxen.  Current Outpatient Medications on File Prior to Visit   Medication Sig Dispense Refill    aspirin (ECOTRIN) 81 MG EC tablet Take 1 tablet (81 mg total) by mouth once daily. 30 tablet 6    esomeprazole (NEXIUM) 20 MG capsule Take 20 mg by mouth before dinner.       losartan (COZAAR) 100 MG tablet Take 1 tablet (100 mg total) by mouth once daily. 90 tablet 0    pravastatin (PRAVACHOL) 40 MG tablet Take 1 tablet (40 mg total) by mouth once daily. 90 tablet 3    naproxen (NAPROSYN) 500 MG tablet Take 1 tablet (500 mg total) by mouth 2 (two) times daily with meals. For pain and  "inflammation 30 tablet 0     No current facility-administered medications on file prior to visit.      He is allergic to flagyl [metronidazole]..    Review of Systems  A comprehensive review of systems was negative.      Objective:     Vitals:    11/29/18 1609   BP: 124/80   Pulse: 72   Resp: 18   SpO2: 98%   Weight: 101.5 kg (223 lb 12.3 oz)   Height: 5' 10" (1.778 m)     Physical Exam   Constitutional: He is oriented to person, place, and time. He appears well-developed and well-nourished.   HENT:   Head: Normocephalic.   Eyes: Pupils are equal, round, and reactive to light.   Neck: Normal range of motion.   Musculoskeletal: Normal range of motion.   Neurological: He is alert and oriented to person, place, and time.   Skin: Skin is warm.   Nursing note and vitals reviewed.      HSAT  Assessment and Recommendations  Study was completed with 3 night protocol. Data on the 3rd night was adequate for analysis. Duration was 7 hr  34 min. Lowest oxygen saturation was 81% average heart rate was 78 beats per minute. Snoring was recorded  above 50 decibels 94% of the time.  Apnea-hypopnea index: AHI: 11.3 events per hour. Total events 86.  Mild obstructive sleep apnea-hypopnea syndrome  Treatment recommendations:  CPAP would be the guideline recommendation for first-line treatment for obstructive sleep apnea.  Either order in lab CPAP titration or AutoPAP device.  Follow-up evaluation and treatment in the sleep disorder clinic.  Other modalities for treatment of obstructive sleep apnea may be explored in patients with intolerant to CPAP including  evaluation by ear nose and throat, mandibular advancement device, Nasal Provent, Hypoglossal nerve stimulator ( INSPIRE)  ,nonsupine sleep positioning device. Significant weight loss is recommended to normal ranges.  Close follow-up to ensure resolution of symptoms.      DOWNLOAD  10/29/2018 - 11/27/2018  YOB: 1960  Mask:  Compliance Summary  10/29/2018 - " 11/27/2018 (30 days)  Days with Device Usage 30 days  Days without Device Usage 0 days  Percent Days with Device Usage 100.0%  Cumulative Usage 9 days 5 hrs. 1 mins. 1 secs.  Maximum Usage (1 Day) 9 hrs. 17 mins. 32 secs.  Average Usage (All Days) 7 hrs. 22 mins. 2 secs.  Average Usage (Days Used) 7 hrs. 22 mins. 2 secs.  Minimum Usage (1 Day) 3 hrs. 32 mins. 16 secs.  Percent of Days with Usage >= 4 Hours 96.7%  Percent of Days with Usage < 4 Hours 3.3%  Date Range  Total Blower Time 9 days 7 hrs. 13 mins. 24 secs.  Average AHI 1.7  Auto-CPAP Summary  Auto-CPAP Mean Pressure 7.8 cmH2O  Auto-CPAP Peak Average Pressure 10.7 cmH2O  Average Device Pressure <= 90% of Time 10.1 cmH2O  Average Time in Large Leak Per Day 7 mins. 6 secs.     Assessment:      Problem List Items Addressed This Visit     TIFFANY on CPAP     [place] score: 3                Plan:     Using device and benefits  Residual AHI 1.7  Educational material on how to clean and maintain device given   based on CMS guidelines patient is adherent and compliant with device.  Will follow up in 6-12 months.  APAP 6-20    Follow-up in about 1 year (around 11/29/2019) for Download CPAP/ APAP/ TRIOLOG/ BIPA, CPAP supplies, Weight loss and exercise.    Time spent: 20 minutes in face to face  discussion concerning diagnosis, prognosis, review of lab and test results, benefits of treatment as well as management of disease, counseling of patient and coordination of care between various health  care providers . Greater than half the time spent was used for coordination of care and counseling of patient.     Genaro Styles    Pulmonary/Critical care/Sleepmedicine

## 2018-11-29 NOTE — PATIENT INSTRUCTIONS
The American College of Physicians has released guidelines on treating obstructive sleep apnea. The recommendations, published in the Annals of Internal Medicine, include the following:    Weight loss should be encouraged for all overweight and obese patients with obstructive sleep apnea.    Continuous positive airway pressure (CPAP) is recommended as a first-line treatment.  Mandibular advancement devices (Opal) are recommended for patients who experience adverse effects with CPAP treatment, those who don't adhere to CPAP, or those who simply prefer Opal.    Current evidence does not support drug therapy for sleep apnea. Evidence is also lacking for the benefits of surgery, so it shouldn't be used as first-line treatment

## 2018-12-11 ENCOUNTER — PATIENT MESSAGE (OUTPATIENT)
Dept: PHYSICAL MEDICINE AND REHAB | Facility: CLINIC | Age: 58
End: 2018-12-11

## 2018-12-11 ENCOUNTER — HOSPITAL ENCOUNTER (OUTPATIENT)
Dept: RADIOLOGY | Facility: HOSPITAL | Age: 58
Discharge: HOME | End: 2018-12-11
Attending: PHYSICAL MEDICINE & REHABILITATION
Payer: COMMERCIAL

## 2018-12-11 ENCOUNTER — OFFICE VISIT (OUTPATIENT)
Dept: PHYSICAL MEDICINE AND REHAB | Facility: CLINIC | Age: 58
End: 2018-12-11
Payer: COMMERCIAL

## 2018-12-11 VITALS
RESPIRATION RATE: 14 BRPM | BODY MASS INDEX: 31.92 KG/M2 | WEIGHT: 223 LBS | DIASTOLIC BLOOD PRESSURE: 84 MMHG | SYSTOLIC BLOOD PRESSURE: 130 MMHG | HEART RATE: 81 BPM | HEIGHT: 70 IN

## 2018-12-11 DIAGNOSIS — M54.16 LUMBAR RADICULOPATHY, CHRONIC: ICD-10-CM

## 2018-12-11 PROCEDURE — 72110 X-RAY EXAM L-2 SPINE 4/>VWS: CPT | Mod: TC,FY,PO

## 2018-12-11 PROCEDURE — 99204 OFFICE O/P NEW MOD 45 MIN: CPT | Mod: S$GLB,,, | Performed by: PHYSICAL MEDICINE & REHABILITATION

## 2018-12-11 PROCEDURE — 72110 X-RAY EXAM L-2 SPINE 4/>VWS: CPT | Mod: 26,,, | Performed by: RADIOLOGY

## 2018-12-11 PROCEDURE — 99999 PR PBB SHADOW E&M-EST. PATIENT-LVL III: CPT | Mod: PBBFAC,,, | Performed by: PHYSICAL MEDICINE & REHABILITATION

## 2018-12-11 RX ORDER — GABAPENTIN 300 MG/1
300 CAPSULE ORAL NIGHTLY
Qty: 30 CAPSULE | Refills: 1 | Status: SHIPPED | OUTPATIENT
Start: 2018-12-11 | End: 2019-04-17

## 2018-12-11 NOTE — LETTER
December 11, 2018      Marlon Mills MD  [address] [place] [address]           [facility]  [address] [place] [address]  Phone: [phone]  Fax: [phone]          Patient: Myles Huynh   MR Number: [id number]   YOB: 1960   Date of Visit: 12/11/2018       Dear Dr. Marlon Mills:    Thank you for referring Myles Huynh to me for evaluation. Attached you will find relevant portions of my assessment and plan of care.    If you have questions, please do not hesitate to call me. I look forward to following Myles Huynh along with you.    Sincerely,    Pili Good MD    Enclosure  CC:  No Recipients    If you would like to receive this communication electronically, please contact [email or web address] or [phone] to request more information on [facility] access.    For providers and/or their staff who would like to refer a patient to [facility], please contact us through our one-stop-shop provider referral line, [facility], at [phone].    If you feel you have received this communication in error or would no longer like to receive these types of communications, please e-mail [email or web address]

## 2018-12-11 NOTE — PATIENT INSTRUCTIONS
Exercises to Strengthen Your Lower Back  Strong lower back and abdominal muscles work together to support your spine. The exercises below will help strengthen the lower back. It is important that you begin exercising slowly and increase levels gradually.  Always begin any exercise program with stretching. If you feel pain while doing any of these exercises, stop and talk to your doctor about a more specific exercise program that better suits your condition.   Low back stretch  The point of stretching is to make you more flexible and increase your range of motion. Stretch only as much as you are able. Stretch slowly. Do not push your stretch to the limit. If at any point you feel pain while stretching, this is your (temporary) limit.  · Lie on your back with your knees bent and both feet on the ground.  · Slowly raise your left knee to your chest as you flatten your lower back against the floor. Hold for 5 seconds.  · Relax and repeat the exercise with your right knee.  · Do 10 of these exercises for each leg.  · Repeat hugging both knees to your chest at the same time.  Building lower back strength  Start your exercise routine with 10 to 30 minutes a day, 1 to 3 times a day.  Initial exercises  Lying on your back:  1. Ankle pumps: Move your foot up and down, towards your head, and then away. Repeat 10 times with each foot.  2. Heel slides: Slowly bend your knee, drawing the heel of your foot towards you. Then slide your heel/foot from you, straightening your knee. Do not lift your foot off the floor (this is not a leg lift).  3. Abdominal contraction: Bend your knees and put your hands on your stomach. Tighten your stomach muscles. Hold for 5 seconds, then relax. Repeat 10 times.  4. Straight leg raise: Bend one leg at the knee and keep the other leg straight. Tighten your stomach muscles. Slowly lift your straight leg 6 to 12 inches off the floor and hold for up to 5 seconds. Repeat 10 times on each  side.  Standin. Wall squats: Stand with your back against the wall. Move your feet about 12 inches away from the wall. Tighten your stomach muscles, and slowly bend your knees until they are at about a 45 degree angle. Do not go down too far. Hold about 5 seconds. Then slowly return to your starting position. Repeat 10 times.  2. Heel raises: Stand facing the wall. Slowly raise the heels of your feet up and down, while keeping your toes on the floor. If you have trouble balancing, you can touch the wall with your hands. Repeat 10 times.  More advanced exercises  When you feel comfortable enough, try these exercises.  1. Kneeling lumbar extension: Begin on your hands and knees. At the same time, raise and straighten your right arm and left leg until they are parallel to the ground. Hold for 2 seconds and come back slowly to a starting position. Repeat with left arm and right leg, alternating 10 times.  2. Prone lumbar extension: Lie face down, arms extended overhead, palms on the floor. At the same time, raise your right arm and left leg as high as comfortably possible. Hold for 10 seconds and slowly return to start. Repeat with left arm and right leg, alternating 10 times. Gradually build up to 20 times. (Advanced: Repeat this exercise raising both arms and both legs a few inches off the floor at the same time. Hold for 5 seconds and release.)  3. Pelvic tilt: Lie on the floor on your back with your knees bent at 90 degrees. Your feet should be flat on the floor. Inhale, exhale, then slowly contract your abdominal muscles bringing your navel toward your spine. Let your pelvis rock back until your lower back is flat on the floor. Hold for 10 seconds while breathing smoothly.  4. Abdominal crunch: Perform a pelvic tilt (above) flattening your lower back against the floor. Holding the tension in your abdominal muscles, take another breath and raise your shoulder blades off the ground (this is not a full sit-up).  Keep your head in line with your body (dont bend your neck forward). Hold for 2 seconds, then slowly lower.  Date Last Reviewed: 6/1/2016  © 4434-0077 [employer]. [address] [place] [address]. All rights reserved. This information is not intended as a substitute for professional medical care. Always follow your healthcare professional's instructions.        Possible Causes of Low Back or Leg Pain    The symptoms in your back or leg may be due to pressure on a nerve. This pressure may be caused by a damaged disk or by abnormal bone growth. Either way, you may feel pain, burning, tingling, or numbness. If you have pressure on a nerve that connects to the sciatic nerve, pain may shoot down your leg.    Pressure from the disk  Constant wear and tear can weaken a disk over time and cause back pain. The disk can then be damaged by a sudden movement or injury. If its soft center begins to bulge, the disk may press on a nerve. Or the outside of the disk may tear, and the soft center may squeeze through and pinch a nerve.    Pressure from bone  As a disk wears out, the vertebrae right above and below the disk begin to touch. This can put pressure on a nerve. Often, abnormal bone (called bone spurs) grows where the vertebrae rub against each other. This can cause the foramen or the spinal canal to narrow (called stenosis) and press against a nerve.  Date Last Reviewed: 10/4/2015  © 5758-6191 [employer]. [address] [place] [address]. All rights reserved. This information is not intended as a substitute for professional medical care. Always follow your healthcare professional's instructions.        Relieving Back Pain  Back pain is a common problem. You can strain back muscles by lifting too much weight or just by moving the wrong way. Back strain can be uncomfortable, even painful. And it can take weeks or months to improve. To help yourself feel better and prevent  future back strains, try these tips.  Important Note: Do not give aspirin to children or teens without first discussing it with your healthcare provider.      ? Ice    Ice reduces muscle pain and swelling. It helps most during the first 24 to 48 hours after an injury.  · Wrap an ice pack or a bag of frozen peas in a thin towel. (Never place ice directly on your skin.)  · Place the ice where your back hurts the most.  · Dont ice for more than 20 minutes at a time.  · You can use ice several times a day.  ? Medicines  Over-the-counter pain relievers can include acetaminophen and anti-inflammatory medicines, which includes aspirin or ibuprofen. They can help ease discomfort. Some also reduce swelling.  · Tell your healthcare provider about any medicines you are already taking.  · Take medicines only as directed.  ? Heat  After the first 48 hours, heat can relax sore muscles and improve blood flow.  · Try a warm bath or shower. Or use a heating pad set on low. To prevent a burn, keep a cloth between you and the heating pad.  · Dont use a heating pad for more than 15 minutes at a time. Never sleep on a heating pad.  Date Last Reviewed: 9/1/2015  © 0269-4863 [employer]. [address] [place] [address], [place] [address]. All rights reserved. This information is not intended as a substitute for professional medical care. Always follow your healthcare professional's instructions.        Back Safety: Poor Posture Hurts  An unhealthy spine often starts with bad habits. Poor movement patterns and posture problems are common causes of back pain. Disk, bone, nerve, and soft tissue problems can all be affected by poor posture. They can lead to pain, stiffness, and other symptoms.    Poor posture backfires  Poor posture can cause pain. Too much slouching puts increased pressure on the disks. An excessive lumbar curve can overload and inflame the vertebrae. As a result, the back muscles may tighten or spasm to splint and  protect the spine. This adds to the pain you feel.    Proper posture: The key to safe movement  Your spine bears your weight throughout the day. This is true whether youre sleeping, standing, or bending. Certain positions strain your spine more than others. But by maintaining proper posture in all positions, you can reduce the stress on your spine.       To improve your standing posture, follow these steps:  · Breathe deeply.  · Relax your shoulders, hips, and ankles. · Think of the ears, shoulders, hips, and ankles as a series of dots. Now, adjust your body to connect the dots in a straight line.  · Tuck your buttocks in just a bit if you need to.      Date Last Reviewed: 10/28/2015  © 3860-2326 [employer]. [address] [place] [address], [place] [address]. All rights reserved. This information is not intended as a substitute for professional medical care. Always follow your healthcare professional's instructions.        Caring for Your Back Throughout the Day  Take care of your back throughout the day. You will likely have fewer back problems if you do. Try to warm up before you move. Shift positions often. Also do your best to form healthy habits.    Warm up for the day  Do a few slow, catlike stretches before starting your day. This simple warmup can soften your disks, stretch your back muscles, and help prevent injuries.  Shift positions often  At work and at home, change positions often. This helps keep your body from getting stiff. Stand up or lean back while you sit. If you can, get up and move every 1/2 hour.  Form healthy habits  Here are some suggestions:   · Keep a healthy weight. When you weigh too much, your back is under excess strain. But losing just a few extra pounds can help a lot.  · Try not to overeat. Learn about serving sizes. The size of a serving depends on the food and the food group. Many foods list serving sizes on the labels.  · Handle minor aches with cold and heat. Apply cold  the first 24 to 48 hours. Use heat after that. Always place a thin cloth between your skin and the source of cold or heat.  · Take medicines as directed. This helps keep pain under control. Always read labels, and call your healthcare provider or pharmacist if you have any questions.  Walk each day  A daily walk keeps your back and thigh muscles stretched and strong. This gives your back better support. Be sure to walk with your spines three curves aligned, by keeping your head, hips, and toes connected by a vertical line.   Date Last Reviewed: 10/18/2015  © 5403-5258 [employer]. [address] [place] [id number]. All rights reserved. This information is not intended as a substitute for professional medical care. Always follow your healthcare professional's instructions.        Understanding Lumbar Radiculopathy    Lumbar radiculopathy is irritation or inflammation of a nerve root in the low back. It causes symptoms that spread out from the back down one or both legs. To understand this condition, it helps to understand the parts of the spine:  · Vertebrae. These are bones that stack to form the spine. The lumbar spine contains the 5 bottom vertebrae.  · Disks. These are soft pads of tissue between the vertebrae. They act as shock absorbers for the spine.  · Spinal canal. This is a tunnel formed within the stacked vertebrae. In the lumbar spine, nerves run through this canal.  · Nerves. These branch off and leave the spinal canal, traveling out to parts of the body. As they leave the spinal canal, nerves pass through openings between the vertebrae. The nerve root is the part of the nerve that is closest to the spinal canal.  · Sciatic nerve. This is a large nerve formed from several nerve roots in the low back. This nerve extends down the back of the leg to the foot.  With lumbar radiculopathy, nerve roots in the low back become irritated. This leads to pain and symptoms. The sciatic nerve is  commonly involved, so the condition is often called sciatica.  What causes lumbar radiculopathy?  Aging, injury, poor posture, extra body weight, and other issues can lead to problems in the low back. These problems may then irritate nerve roots. They include:  · Damage to a disk in the lumbar spine. The damaged disk may then press on nearby nerve roots.  · Degeneration from wear and tear, and aging. This can lead to narrowing (stenosis) of the openings between the vertebrae. The narrowed openings press on nerve roots as they leave the spinal canal.  · Unstable spine. This is when a vertebra slips forward. It can then press on a nerve root.  Other, less common things can put pressure on nerves in the low back. These include diabetes, infection, or a tumor.  Symptoms of lumbar radiculopathy  These include:  · Pain in the low back  · Pain, numbness, tingling, or weakness that travels into the buttocks, hip, groin, or leg  · Muscle spasms  Treatment for lumbar radiculopathy  In most cases, your healthcare provider will first try treatments that help relieve symptoms. These may include:  · Prescription and over-the-counter pain medicines. These help relieve pain, swelling, and irritation.  · Limits on positions and activities that increase pain. But lying in bed or avoiding all movement is only recommended for a short period of time.  · Physical therapy, including exercises and stretches. This helps decrease pain and increase movement and function.  · Steroid shots into the lower back. This may help relieve symptoms for a time.  · Weight-loss program. If you are overweight, losing extra pounds may help relieve symptoms.  In some cases, you may need surgery to fix the underlying problem. This depends on the cause, the symptoms, and how long the pain has lasted.  Possible complications  Over time, an irritated and inflamed nerve may become damaged. This may lead to long-lasting (permanent) numbness or weakness in your  legs and feet. If symptoms change suddenly or get worse, be sure to let your healthcare provider know.  When to call your healthcare provider  Call your healthcare provider right away if you have any of these:  · New pain or pain that gets worse  · New or increasing weakness, tingling, or numbness in your leg or foot  · Problems controlling your bladder or bowel   Date Last Reviewed: 3/10/2016  © 1661-7158 [employer]. [address] [place] [address]. All rights reserved. This information is not intended as a substitute for professional medical care. Always follow your healthcare professional's instructions.        Back Exercises: Abdominal Lift Brace with Marching    The abdominal lift brace with march strengthens your lower abdominal muscles, helping you keep your pelvis and back stable:  · Lie on the floor with both knees bent. Put your feet flat on the floor and your arms by your sides. Tighten your abdominal muscles. Be sure to continue to breathe.  · Lift one bent knee about 2 inches then return it to the floor and lift the other about 2 inches. Keep your abdominal muscles tight and continue to breathe. These motions should be slow and controlled without your pelvis rocking side to side.  · Repeat 10 times.  Date Last Reviewed: 8/16/2015  © 2747-7052 [employer]. [address] [place] [address]. All rights reserved. This information is not intended as a substitute for professional medical care. Always follow your healthcare professional's instructions.        Back Exercises: Back Press    Do this exercise on your hands and knees. Keep your knees under your hips and your hands under your shoulders. Keep your spine in a neutral position (not arched or sagging). Be sure to maintain your necks natural curve:  · Tighten your stomach and buttock muscles to press your back upward. Let your head drop slightly.  · Hold for 5 seconds. Return to starting  position.  · Repeat 5 times.  Date Last Reviewed: 10/11/2015  © 0086-3598 [employer]. [address] [place] [address]. All rights reserved. This information is not intended as a substitute for professional medical care. Always follow your healthcare professional's instructions.        Back Exercises: Knee Lift         To start, lie on your back with your knees bent and feet flat on the floor. Dont press your neck or lower back to the floor. Breathe deeply. You should feel comfortable and relaxed in this position:  · Start by tightening your abdominal muscles.  · Lift one bent knee off the floor 2 to 4 inches.  · Hold for 10 seconds. Return to start position.  · Repeat 3 times.  · Switch legs.  Date Last Reviewed: 8/16/2015 © 2000-2017 [employer]. [address] [place] [address]. All rights reserved. This information is not intended as a substitute for professional medical care. Always follow your healthcare professional's instructions.        Back Exercises: Leg Pull    To start, lie on your back with your knees bent and feet flat on the floor. Dont press your neck or lower back to the floor. Breathe deeply. You should feel comfortable and relaxed in this position.  · Pull one knee to your chest.  · Hold for 30 to 60 seconds. Return to starting position.  · Repeat 2 times.  · Switch legs.  · For a double leg pull, pull both legs to your chest at the same time. Repeat 2 times.  For your safety, check with your healthcare provider before starting an exercise program.   Date Last Reviewed: 8/16/2015 © 2000-2017 [employer]. [address] [place] [address]. All rights reserved. This information is not intended as a substitute for professional medical care. Always follow your healthcare professional's instructions.        Back Exercises: Lower Back Rotation    To start, lie on your back with your knees bent and feet flat on the floor. Dont  press your neck or lower back to the floor. Breathe deeply. You should feel comfortable and relaxed in this position.  · Drop both knees to one side. Turn your head to the other side. Keep your shoulders flat on the floor.  · Do not push through pain.  · Hold for 20 seconds.  · Slowly switch sides.  · Repeat 2 to 5 times.  Date Last Reviewed: 10/11/2015  © 6024-9567 [employer] [address] [place] [address]. All rights reserved. This information is not intended as a substitute for professional medical care. Always follow your healthcare professional's instructions.        Back Exercises: Lower Back Stretch    To start, sit in a chair with your feet flat on the floor. Shift your weight slightly forward. Relax, and keep your ears, shoulders, and hips aligned.  · Sit with your feet well apart.  · Bend forward and touch the floor with the backs of your hands. Relax and let your body drop.  · Hold for 20 seconds. Return to starting position.  · Repeat 2 times.   Date Last Reviewed: 8/16/2015 © 2000-2017 [employer] [address] [place] [address]. All rights reserved. This information is not intended as a substitute for professional medical care. Always follow your healthcare professional's instructions.        Back Exercises: Seated Rotation    To start, sit in a chair with your feet flat on the floor. Shift your weight slightly forward to avoid rounding your back. Relax, and keep your ears, shoulders, and hips aligned:  · Fold your arms and elbows just below shoulder height.  · Turn from the waist with hips forward. Turn your head last. Do not push through the pain.  · Hold for a count of 10 to 30. Return to starting position.  · Repeat 3 to 5 times on one side. Then switch sides.  Date Last Reviewed: 10/11/2015  © 9567-0745 [employer] [address] [place] [address]. All rights reserved. This information is not intended as a substitute for  professional medical care. Always follow your healthcare professional's instructions.

## 2018-12-11 NOTE — PROGRESS NOTES
PM&R NEW PATIENT HISTORY & PHYSICAL :    Referring Physician:Gene    Chief Complaint   Patient presents with    Leg Pain     bilateral, upper; left is the worst    Numbness     upper legs       HPI: This is a 58 y.o.  male being seen in clinic today for evaluation of lateral thigh numbness/tingling and occasional sharp pain over the past 2 months.  He denies specific exacerbating factors and can experience symptoms while lying down or sitting.  Nothing really provides significant relief.     History obtained from patient    Functional History:  Walking: Not limited  Transfers: Independent  Assistive devices: No  Power mobility: No  Falls: None     Needs help with:  Nothing - all ADLS normal    Cooking   Cleaning  Bathing   Dressing   Toileting     Past family, medical, social, and surgical history reviewed in chart    Review of Systems:     General- denies lethargy, weight change, fever, chills  Head/neck- denies swallowing difficulties  ENT- denies hearing changes  Cardiovascular-denies chest pain  Pulmonary- denies shortness of breath  GI- denies constipation or bowel incontinence  - denies bladder incontinence  Skin- denies wounds or rashes  Musculoskeletal- denies weakness, +pain  Neurologic- +numbness and tingling  Psychiatric- denies depressive or psychotic features, denies anxiety  Lymphatic-denies swelling  Endocrine- denies hypoglycemic symptoms/DM history  All other pertinent systems negative     Physical Examination:  General: Well developed, well nourished male, NAD  HEENT:NCAT EOMI bilaterally   Pulmonary:Normal respirations    Spinal Examination: CERVICAL  Active ROM is within normal limits.  Inspection: No deformity of spinal alignment.    Spinal Examination: LUMBAR or THORACIC  Active ROM is within normal limits.  Inspection: No deformity of spinal alignment.  No palpable olisthesis.  Palpation: No vertebral tenderness to percussion.  ttp at si joints-worse on left  Facet loading neg  bilaterally  SLR Test (seated and supine):negative  bilaterally  Able to stand on heels and toes    Bilateral Upper and Lower Extremities:  Pulses are 2+ at radial bilaterally.  Shoulder/Elbow/Wrist/Hand ROM   Hip/Knee/Ankle ROM wnl  Bilateral Extremities show normal capillary refill.  No signs of cyanosis, rubor, edema, skin changes, or dysvascular changes of appendages.  Nails appear intact.    Neurological Exam:  Cranial Nerves:  II-XII grossly intact    Manual Muscle Testing: (Motor 5=normal)    RIGHT Lower extremity: Hip flexion 5/5, Hip Abduction 5/5, Hip Adduction 5/5, Knee extension 5/5, Knee flexion 5/5, Ankle dorsiflexion 5/5, Extensor hallucis longus 5/5, Ankle plantarflexion 5/5  LEFT Lower extremity:  Hip flexion 5/5, Hip Abduction 5/5,Hip Adduction 5/5, Knee extension 5/5, Knee flexion 5/5, Ankle dorsiflexion 5/5, Extensor hallucis longus 5/5, Ankle plantarflexion 5/5    No focal atrophy is noted of either lower extremity.    Bilateral Reflexes:hypo at patellar  No clonus at knee or ankle.    Sensation: tested to light touch  - intact in legs except dec at lateral thighs-worse on left  Gait: Narrow base and good arm swing.      IMPRESSION/PLAN: This is a 58 y.o.  male with probable lumbar radiculitis    1. Xray lumbar spine today-will send results via [facility]  2. Handouts on back care, exercise, etc provided  3. Gabapentin 300mg QHS  4. Will fu prn if not responding to conservative measures, pt may try formal PT or refer to ANA Good M.D.  Physical Medicine and Re[facility]

## 2019-01-01 ENCOUNTER — PATIENT MESSAGE (OUTPATIENT)
Dept: PULMONOLOGY | Facility: HOSPITAL | Age: 59
End: 2019-01-01

## 2019-01-07 ENCOUNTER — PATIENT MESSAGE (OUTPATIENT)
Dept: PHYSICAL MEDICINE AND REHAB | Facility: CLINIC | Age: 59
End: 2019-01-07

## 2019-02-05 ENCOUNTER — PATIENT MESSAGE (OUTPATIENT)
Dept: PULMONOLOGY | Facility: CLINIC | Age: 59
End: 2019-02-05

## 2019-02-11 DIAGNOSIS — I10 HYPERTENSION, UNSPECIFIED TYPE: ICD-10-CM

## 2019-02-11 RX ORDER — LOSARTAN POTASSIUM 100 MG/1
TABLET ORAL
Qty: 90 TABLET | Refills: 0 | Status: SHIPPED | OUTPATIENT
Start: 2019-02-11 | End: 2019-04-17 | Stop reason: SDUPTHER

## 2019-02-11 NOTE — TELEPHONE ENCOUNTER
Notified pt that his losartan has been refilled to [facility] but that he is due for his 6 mth f/u appt with Dr. Mills.  Pt wanted to know what this appt is for since he saw Dr. Mills in November.  Advised pt that this appt would be his bi-annual appt as a f/u from 8/21/18.  Pt stated he is supposed to complete blood work on 4/10/19 for Dr. Stewart and would like to see Dr. Mills after that blood work is done so that Dr. Mills will have the result.  Scheduled pt with Dr. Mills on 4/17/19.

## 2019-03-02 ENCOUNTER — PATIENT MESSAGE (OUTPATIENT)
Dept: PULMONOLOGY | Facility: HOSPITAL | Age: 59
End: 2019-03-02

## 2019-04-02 ENCOUNTER — PATIENT MESSAGE (OUTPATIENT)
Dept: PULMONOLOGY | Facility: CLINIC | Age: 59
End: 2019-04-02

## 2019-04-03 ENCOUNTER — PATIENT OUTREACH (OUTPATIENT)
Dept: ADMINISTRATIVE | Facility: HOSPITAL | Age: 59
End: 2019-04-03

## 2019-04-10 ENCOUNTER — LAB VISIT (OUTPATIENT)
Dept: LAB | Facility: HOSPITAL | Age: 59
End: 2019-04-10
Attending: INTERNAL MEDICINE
Payer: COMMERCIAL

## 2019-04-10 DIAGNOSIS — E78.00 PURE HYPERCHOLESTEROLEMIA: ICD-10-CM

## 2019-04-10 DIAGNOSIS — R93.1 AGATSTON CORONARY ARTERY CALCIUM SCORE LESS THAN 100: ICD-10-CM

## 2019-04-10 LAB
ALBUMIN SERPL BCP-MCNC: 3.9 G/DL (ref 3.5–5.2)
ALP SERPL-CCNC: 74 U/L (ref 55–135)
ALT SERPL W/O P-5'-P-CCNC: 27 U/L (ref 10–44)
ANION GAP SERPL CALC-SCNC: 7 MMOL/L (ref 8–16)
AST SERPL-CCNC: 26 U/L (ref 10–40)
BILIRUB SERPL-MCNC: 0.7 MG/DL (ref 0.1–1)
BUN SERPL-MCNC: 11 MG/DL (ref 6–20)
CALCIUM SERPL-MCNC: 9.4 MG/DL (ref 8.7–10.5)
CHLORIDE SERPL-SCNC: 104 MMOL/L (ref 95–110)
CHOLEST SERPL-MCNC: 206 MG/DL (ref 120–199)
CHOLEST/HDLC SERPL: 3.7 {RATIO} (ref 2–5)
CO2 SERPL-SCNC: 28 MMOL/L (ref 23–29)
CREAT SERPL-MCNC: 1.2 MG/DL (ref 0.5–1.4)
EST. GFR  (AFRICAN AMERICAN): >60 ML/MIN/1.73 M^2
EST. GFR  (NON AFRICAN AMERICAN): >60 ML/MIN/1.73 M^2
GLUCOSE SERPL-MCNC: 93 MG/DL (ref 70–110)
HDLC SERPL-MCNC: 56 MG/DL (ref 40–75)
HDLC SERPL: 27.2 % (ref 20–50)
LDLC SERPL CALC-MCNC: 119 MG/DL (ref 63–159)
NONHDLC SERPL-MCNC: 150 MG/DL
POTASSIUM SERPL-SCNC: 4.4 MMOL/L (ref 3.5–5.1)
PROT SERPL-MCNC: 6.9 G/DL (ref 6–8.4)
SODIUM SERPL-SCNC: 139 MMOL/L (ref 136–145)
TRIGL SERPL-MCNC: 155 MG/DL (ref 30–150)

## 2019-04-10 PROCEDURE — 80061 LIPID PANEL: CPT

## 2019-04-10 PROCEDURE — 80053 COMPREHEN METABOLIC PANEL: CPT

## 2019-04-10 PROCEDURE — 36415 COLL VENOUS BLD VENIPUNCTURE: CPT

## 2019-04-17 ENCOUNTER — CLINICAL SUPPORT (OUTPATIENT)
Dept: CARDIOLOGY | Facility: CLINIC | Age: 59
End: 2019-04-17
Payer: COMMERCIAL

## 2019-04-17 ENCOUNTER — OFFICE VISIT (OUTPATIENT)
Dept: INTERNAL MEDICINE | Facility: CLINIC | Age: 59
End: 2019-04-17
Payer: COMMERCIAL

## 2019-04-17 ENCOUNTER — OFFICE VISIT (OUTPATIENT)
Dept: CARDIOLOGY | Facility: CLINIC | Age: 59
End: 2019-04-17
Payer: COMMERCIAL

## 2019-04-17 VITALS
TEMPERATURE: 98 F | BODY MASS INDEX: 32.42 KG/M2 | OXYGEN SATURATION: 98 % | DIASTOLIC BLOOD PRESSURE: 74 MMHG | WEIGHT: 226 LBS | SYSTOLIC BLOOD PRESSURE: 124 MMHG | HEART RATE: 74 BPM

## 2019-04-17 VITALS
DIASTOLIC BLOOD PRESSURE: 80 MMHG | HEIGHT: 70 IN | WEIGHT: 226 LBS | BODY MASS INDEX: 32.35 KG/M2 | SYSTOLIC BLOOD PRESSURE: 130 MMHG | HEART RATE: 56 BPM

## 2019-04-17 DIAGNOSIS — E78.00 PURE HYPERCHOLESTEROLEMIA: ICD-10-CM

## 2019-04-17 DIAGNOSIS — I10 ESSENTIAL HYPERTENSION: ICD-10-CM

## 2019-04-17 DIAGNOSIS — Z00.00 ROUTINE GENERAL MEDICAL EXAMINATION AT A HEALTH CARE FACILITY: Primary | ICD-10-CM

## 2019-04-17 DIAGNOSIS — R93.1 AGATSTON CORONARY ARTERY CALCIUM SCORE LESS THAN 100: ICD-10-CM

## 2019-04-17 DIAGNOSIS — R93.1 AGATSTON CORONARY ARTERY CALCIUM SCORE LESS THAN 100: Primary | ICD-10-CM

## 2019-04-17 DIAGNOSIS — I10 HYPERTENSION, UNSPECIFIED TYPE: ICD-10-CM

## 2019-04-17 DIAGNOSIS — G47.33 OSA ON CPAP: ICD-10-CM

## 2019-04-17 DIAGNOSIS — I10 ESSENTIAL HYPERTENSION: Primary | ICD-10-CM

## 2019-04-17 PROCEDURE — 99999 PR PBB SHADOW E&M-EST. PATIENT-LVL III: ICD-10-PCS | Mod: PBBFAC,,, | Performed by: INTERNAL MEDICINE

## 2019-04-17 PROCEDURE — 99214 PR OFFICE/OUTPT VISIT, EST, LEVL IV, 30-39 MIN: ICD-10-PCS | Mod: S$GLB,,, | Performed by: INTERNAL MEDICINE

## 2019-04-17 PROCEDURE — 99999 PR PBB SHADOW E&M-EST. PATIENT-LVL III: CPT | Mod: PBBFAC,,, | Performed by: INTERNAL MEDICINE

## 2019-04-17 PROCEDURE — 93000 ELECTROCARDIOGRAM COMPLETE: CPT | Mod: S$GLB,,, | Performed by: INTERNAL MEDICINE

## 2019-04-17 PROCEDURE — 99214 OFFICE O/P EST MOD 30 MIN: CPT | Mod: S$GLB,,, | Performed by: INTERNAL MEDICINE

## 2019-04-17 PROCEDURE — 99396 PREV VISIT EST AGE 40-64: CPT | Mod: S$GLB,,, | Performed by: INTERNAL MEDICINE

## 2019-04-17 PROCEDURE — 99396 PR PREVENTIVE VISIT,EST,40-64: ICD-10-PCS | Mod: S$GLB,,, | Performed by: INTERNAL MEDICINE

## 2019-04-17 PROCEDURE — 93000 EKG 12-LEAD: ICD-10-PCS | Mod: S$GLB,,, | Performed by: INTERNAL MEDICINE

## 2019-04-17 RX ORDER — LOSARTAN POTASSIUM 100 MG/1
100 TABLET ORAL DAILY
Qty: 90 TABLET | Refills: 1 | Status: SHIPPED | OUTPATIENT
Start: 2019-04-17 | End: 2019-11-15 | Stop reason: SDUPTHER

## 2019-04-17 RX ORDER — ATORVASTATIN CALCIUM 40 MG/1
40 TABLET, FILM COATED ORAL DAILY
Qty: 90 TABLET | Refills: 3 | Status: SHIPPED | OUTPATIENT
Start: 2019-04-17 | End: 2020-02-14 | Stop reason: SDUPTHER

## 2019-04-17 NOTE — PROGRESS NOTES
Subjective:   Patient ID:  Myles Huynh is a 58 y.o. male who presents for follow up of Follow-up      HPI  A 57 yo male with sleep apnea  Coronary calcium has been exercising has been using cpap his lipids has imporved but not on target he trie sto be healthy. His calories intake is high has no chest pain or shortness of breath has been working out he gets short of breath after a hard work out he retired, no chest pain. His ekg shows sinus badycardia.  Past Medical History:   Diagnosis Date    Colon polyps     DVT (deep venous thrombosis)     Esophageal stricture     IBS (irritable bowel syndrome)     Pure hypercholesterolemia 5/11/2018       Past Surgical History:   Procedure Laterality Date    APPENDECTOMY      COLONOSCOPY N/A 12/15/2014    Performed by Verito Dan [facility] at [facility] END[facility]    COLONOSCOPY W/ POLYPECTOMY      MOUTH SURGERY Left 09/2018    Oral Surrgery (dental)    ROTATOR CUFF REPAIR Right 12/2017       Social History     Tobacco Use    Smoking status: Never Smoker    Smokeless tobacco: Never Used   Substance Use Topics    Alcohol use: Yes     Comment: social    Drug use: No       Family History   Problem Relation Age of Onset    Diabetes Brother     Colon cancer Brother 40    No Known Problems Mother     No Known Problems Father        Current Outpatient Medications   Medication Sig    aspirin (ECOTRIN) 81 MG EC tablet Take 1 tablet (81 mg total) by mouth once daily.    esomeprazole (NEXIUM) 20 MG capsule Take 20 mg by mouth before dinner.     losartan (COZAAR) 100 MG tablet Take 1 tablet (100 mg total) by mouth once daily.    pravastatin (PRAVACHOL) 40 MG tablet Take 1 tablet (40 mg total) by mouth once daily.     No current facility-administered medications for this visit.      Current Outpatient Medications on File Prior to Visit   Medication Sig    aspirin (ECOTRIN) 81 MG EC tablet Take 1 tablet (81 mg total) by mouth once daily.    esomeprazole (NEXIUM) 20 MG capsule  Take 20 mg by mouth before dinner.     pravastatin (PRAVACHOL) 40 MG tablet Take 1 tablet (40 mg total) by mouth once daily.    [DISCONTINUED] gabapentin (NEURONTIN) 300 MG capsule Take 1 capsule (300 mg total) by mouth every evening.    [DISCONTINUED] losartan (COZAAR) 100 MG tablet TAKE 1 TABLET ONCE DAILY    [DISCONTINUED] naproxen (NAPROSYN) 500 MG tablet Take 1 tablet (500 mg total) by mouth 2 (two) times daily with meals. For pain and inflammation     No current facility-administered medications on file prior to visit.      Review of patient's allergies indicates:   Allergen Reactions    Flagyl [metronidazole] Swelling     Throat swelling     Review of Systems   Constitution: Negative for malaise/fatigue.   Eyes: Negative for blurred vision.   Cardiovascular: Negative for chest pain, claudication, cyanosis, dyspnea on exertion, irregular heartbeat, leg swelling, near-syncope, orthopnea, palpitations and paroxysmal nocturnal dyspnea.   Respiratory: Negative for cough, hemoptysis and shortness of breath.    Hematologic/Lymphatic: Negative for bleeding problem. Does not bruise/bleed easily.   Skin: Negative for dry skin and itching.   Musculoskeletal: Negative for falls, muscle weakness and myalgias.   Gastrointestinal: Negative for abdominal pain, diarrhea, heartburn, hematemesis, hematochezia and melena.   Genitourinary: Negative for flank pain and hematuria.   Neurological: Negative for dizziness, focal weakness, headaches, light-headedness, numbness, paresthesias, seizures and weakness.   Psychiatric/Behavioral: Negative for altered mental status and memory loss. The patient is not nervous/anxious.    Allergic/Immunologic: Negative for hives.       Objective:   Physical Exam   Constitutional: He is oriented to person, place, and time. He appears well-developed and well-nourished. No distress.   HENT:   Head: Normocephalic and atraumatic.   Eyes: Pupils are equal, round, and reactive to light. EOM are  "normal. Right eye exhibits no discharge. Left eye exhibits no discharge.   Neck: Neck supple. No JVD present. No thyromegaly present.   Cardiovascular: Normal rate, regular rhythm, normal heart sounds and intact distal pulses. Exam reveals no gallop and no friction rub.   No murmur heard.  Pulmonary/Chest: Effort normal and breath sounds normal. No respiratory distress. He has no wheezes. He has no rales. He exhibits no tenderness.   Abdominal: Soft. Bowel sounds are normal. He exhibits no distension. There is no tenderness.   Musculoskeletal: Normal range of motion. He exhibits no edema.   Neurological: He is alert and oriented to person, place, and time. No cranial nerve deficit.   Skin: Skin is warm and dry. No rash noted. He is not diaphoretic. No erythema.   Psychiatric: He has a normal mood and affect. His behavior is normal.   Nursing note and vitals reviewed.    Vitals:    04/17/19 1337   BP: 130/80   BP Location: Right arm   Patient Position: Sitting   BP Method: Large (Manual)   Pulse: (!) 56   Weight: 102.5 kg (225 lb 15.5 oz)   Height: 5' 10" (1.778 m)     Lab Results   Component Value Date    CHOL 206 (H) 04/10/2019    CHOL 208 (H) 10/15/2018    CHOL 253 (H) 04/10/2018     Lab Results   Component Value Date    HDL 56 04/10/2019    HDL 56 10/15/2018    HDL 59 04/10/2018     Lab Results   Component Value Date    LDLCALC 119.0 04/10/2019    LDLCALC 121.4 10/15/2018    LDLCALC 167.2 (H) 04/10/2018     Lab Results   Component Value Date    TRIG 155 (H) 04/10/2019    TRIG 153 (H) 10/15/2018    TRIG 134 04/10/2018     Lab Results   Component Value Date    CHOLHDL 27.2 04/10/2019    CHOLHDL 26.9 10/15/2018    CHOLHDL 23.3 04/10/2018       Chemistry        Component Value Date/Time     04/10/2019 0910    K 4.4 04/10/2019 0910     04/10/2019 0910    CO2 28 04/10/2019 0910    BUN 11 04/10/2019 0910    CREATININE 1.2 04/10/2019 0910    GLU 93 04/10/2019 0910        Component Value Date/Time    CALCIUM " 9.4 04/10/2019 0910    ALKPHOS 74 04/10/2019 0910    AST 26 04/10/2019 0910    ALT 27 04/10/2019 0910    BILITOT 0.7 04/10/2019 0910    ESTGFRAFRICA >60.0 04/10/2019 0910    EGFRNONAA >60.0 04/10/2019 0910          Lab Results   Component Value Date    TSH 1.395 04/10/2018     No results found for: INR, PROTIME  Lab Results   Component Value Date    WBC 7.68 11/10/2017    HGB 14.8 11/10/2017    HCT 45.0 11/10/2017     (H) 11/10/2017     11/10/2017     BMP  Sodium   Date Value Ref Range Status   04/10/2019 139 136 - 145 mmol/L Final     Potassium   Date Value Ref Range Status   04/10/2019 4.4 3.5 - 5.1 mmol/L Final     Chloride   Date Value Ref Range Status   04/10/2019 104 95 - 110 mmol/L Final     CO2   Date Value Ref Range Status   04/10/2019 28 23 - 29 mmol/L Final     BUN, Bld   Date Value Ref Range Status   04/10/2019 11 6 - 20 mg/dL Final     Creatinine   Date Value Ref Range Status   04/10/2019 1.2 0.5 - 1.4 mg/dL Final     Calcium   Date Value Ref Range Status   04/10/2019 9.4 8.7 - 10.5 mg/dL Final     Anion Gap   Date Value Ref Range Status   04/10/2019 7 (L) 8 - 16 mmol/L Final     eGFR if    Date Value Ref Range Status   04/10/2019 >60.0 >60 mL/min/1.73 m^2 Final     eGFR if non    Date Value Ref Range Status   04/10/2019 >60.0 >60 mL/min/1.73 m^2 Final     Comment:     Calculation used to obtain the estimated glomerular filtration  rate (eGFR) is the CKD-EPI equation.        CrCl cannot be calculated (Patient's most recent lab result is older than the maximum 7 days allowed.).    Assessment:     1. Agatston coronary artery calcium score less than 100    2. Pure hypercholesterolemia    3. Essential hypertension    4. TIFFANY on CPAP      counseled about diet exercise compliance weight loss calories count.  Lipids needs better therapy will switch to lipitor.  Plan:   lipitor 40 mg po daily  Continue current therapy  Cardiac low salt diet.  Risk factor modification  and excercise program./weight loss  F/u in 6 months with lipid cmp

## 2019-04-17 NOTE — PROGRESS NOTES
Subjective:      Patient ID: Myles Huynh is a 58 y.o. male.    Chief Complaint: Follow-up    HPI   57 yo with   Patient Active Problem List   Diagnosis    External hemorrhoid    IBS (irritable bowel syndrome)    History of colon polyps    Pure hypercholesterolemia    Agatston coronary artery calcium score less than 100    Essential hypertension    Eosinophilic esophagitis    TIFFANY on CPAP     Past Medical History:   Diagnosis Date    Colon polyps     DVT (deep venous thrombosis)     Esophageal stricture     IBS (irritable bowel syndrome)     Pure hypercholesterolemia 5/11/2018     Here today for annual prev exam.  Compliant with meds without significant side effects. Energy and appetite are good.     Review of Systems   Constitutional: Positive for activity change. Negative for unexpected weight change.   HENT: Negative for hearing loss, rhinorrhea and trouble swallowing.    Eyes: Negative for discharge and visual disturbance.   Respiratory: Negative for chest tightness and wheezing.    Cardiovascular: Negative for chest pain and palpitations.   Gastrointestinal: Negative for blood in stool, constipation, diarrhea and vomiting.   Endocrine: Negative for polydipsia and polyuria.   Genitourinary: Negative for difficulty urinating, hematuria and urgency.   Musculoskeletal: Positive for arthralgias. Negative for joint swelling and neck pain.   Neurological: Negative for weakness and headaches.   Psychiatric/Behavioral: Negative for confusion and dysphoric mood.     Objective:   /74 (BP Location: Left arm, Patient Position: Sitting, BP Method: Large (Manual))   Pulse 74   Temp 98 °F (36.7 °C) (Tympanic)   Wt 102.5 kg (225 lb 15.5 oz)   SpO2 98%   BMI 32.42 kg/m²     Physical Exam   Constitutional: He is oriented to person, place, and time. He appears well-developed and well-nourished. No distress.   HENT:   Head: Normocephalic and atraumatic.   Mouth/Throat: Oropharynx is clear and moist.    Eyes: Pupils are equal, round, and reactive to light. EOM are normal.   Neck: Neck supple. No thyromegaly present.   Cardiovascular: Normal rate and regular rhythm.   Pulmonary/Chest: Breath sounds normal. He has no wheezes. He has no rales.   Abdominal: Soft. Bowel sounds are normal. There is no tenderness.   Musculoskeletal: He exhibits no edema.   Lymphadenopathy:     He has no cervical adenopathy.   Neurological: He is alert and oriented to person, place, and time.   Skin: Skin is warm and dry.   Psychiatric: He has a normal mood and affect. His behavior is normal.       Assessment:     1. Routine general medical examination at a health care facility    2. Agatston coronary artery calcium score less than 100    3. Essential hypertension    4. Hypertension, unspecified type    5. TIFFANY on CPAP      Plan:   Routine general medical examination at a health care facility  Heart healthy diet and reg exercise  HM reviewed    Agatston coronary artery calcium score less than 100    Essential hypertension  stable  Hypertension, unspecified type  controlled  -     losartan (COZAAR) 100 MG tablet; Take 1 tablet (100 mg total) by mouth once daily.  Dispense: 90 tablet; Refill: 1    TIFFANY on CPAP  Cont cpap      Lab Frequency Next Occurrence       Problem List Items Addressed This Visit        Cardiac/Vascular    Agatston coronary artery calcium score less than 100    Essential hypertension       Other    TIFFANY on CPAP      Other Visit Diagnoses     Routine general medical examination at a health care facility    -  Primary    Hypertension, unspecified type        Relevant Medications    losartan (COZAAR) 100 MG tablet          Follow up in about 6 months (around 10/17/2019), or if symptoms worsen or fail to improve.

## 2019-04-24 ENCOUNTER — PATIENT MESSAGE (OUTPATIENT)
Dept: CARDIOLOGY | Facility: CLINIC | Age: 59
End: 2019-04-24

## 2019-05-08 ENCOUNTER — PATIENT MESSAGE (OUTPATIENT)
Dept: CARDIOLOGY | Facility: CLINIC | Age: 59
End: 2019-05-08

## 2019-06-04 ENCOUNTER — PATIENT MESSAGE (OUTPATIENT)
Dept: PULMONOLOGY | Facility: CLINIC | Age: 59
End: 2019-06-04

## 2019-07-25 ENCOUNTER — PATIENT MESSAGE (OUTPATIENT)
Dept: CARDIOLOGY | Facility: CLINIC | Age: 59
End: 2019-07-25

## 2019-07-26 ENCOUNTER — OFFICE VISIT (OUTPATIENT)
Dept: INTERNAL MEDICINE | Facility: CLINIC | Age: 59
End: 2019-07-26
Payer: COMMERCIAL

## 2019-07-26 ENCOUNTER — PATIENT MESSAGE (OUTPATIENT)
Dept: INTERNAL MEDICINE | Facility: CLINIC | Age: 59
End: 2019-07-26

## 2019-07-26 ENCOUNTER — HOSPITAL ENCOUNTER (OUTPATIENT)
Dept: RADIOLOGY | Facility: HOSPITAL | Age: 59
Discharge: HOME | End: 2019-07-26
Attending: PHYSICIAN ASSISTANT
Payer: COMMERCIAL

## 2019-07-26 VITALS
HEART RATE: 87 BPM | SYSTOLIC BLOOD PRESSURE: 120 MMHG | WEIGHT: 215.19 LBS | DIASTOLIC BLOOD PRESSURE: 70 MMHG | RESPIRATION RATE: 16 BRPM | TEMPERATURE: 98 F | HEIGHT: 70 IN | OXYGEN SATURATION: 98 % | BODY MASS INDEX: 30.81 KG/M2

## 2019-07-26 DIAGNOSIS — M79.605 PAIN OF LEFT LOWER EXTREMITY: ICD-10-CM

## 2019-07-26 DIAGNOSIS — M79.605 PAIN OF LEFT LOWER EXTREMITY: Primary | ICD-10-CM

## 2019-07-26 PROCEDURE — 99214 OFFICE O/P EST MOD 30 MIN: CPT | Mod: S$GLB,,, | Performed by: PHYSICIAN ASSISTANT

## 2019-07-26 PROCEDURE — 99214 PR OFFICE/OUTPT VISIT, EST, LEVL IV, 30-39 MIN: ICD-10-PCS | Mod: S$GLB,,, | Performed by: PHYSICIAN ASSISTANT

## 2019-07-26 PROCEDURE — 73560 X-RAY EXAM OF KNEE 1 OR 2: CPT | Mod: TC,RT

## 2019-07-26 PROCEDURE — 73560 XR KNEE ORTHO LEFT: ICD-10-PCS | Mod: 26,59,RT, | Performed by: RADIOLOGY

## 2019-07-26 PROCEDURE — 73562 XR KNEE ORTHO LEFT: ICD-10-PCS | Mod: 26,LT,, | Performed by: RADIOLOGY

## 2019-07-26 PROCEDURE — 99999 PR PBB SHADOW E&M-EST. PATIENT-LVL IV: ICD-10-PCS | Mod: PBBFAC,,, | Performed by: PHYSICIAN ASSISTANT

## 2019-07-26 PROCEDURE — 99999 PR PBB SHADOW E&M-EST. PATIENT-LVL IV: CPT | Mod: PBBFAC,,, | Performed by: PHYSICIAN ASSISTANT

## 2019-07-26 PROCEDURE — 73562 X-RAY EXAM OF KNEE 3: CPT | Mod: 26,LT,, | Performed by: RADIOLOGY

## 2019-07-26 PROCEDURE — 73560 X-RAY EXAM OF KNEE 1 OR 2: CPT | Mod: 26,59,RT, | Performed by: RADIOLOGY

## 2019-07-26 RX ORDER — AZITHROMYCIN 250 MG/1
TABLET, FILM COATED ORAL
COMMUNITY
Start: 2019-05-14 | End: 2019-12-12

## 2019-07-26 NOTE — PROGRESS NOTES
Subjective:      Patient ID: Myles Huynh is a 58 y.o. male.    Chief Complaint: Leg Pain    Leg Pain    The incident occurred 3 to 5 days ago. There was no injury mechanism. Pain location: left thigh (medial) The pain is at a severity of 5/10. The patient is experiencing no pain. The pain has been constant since onset. Associated symptoms include numbness and tingling. Pertinent negatives include no inability to bear weight, loss of motion, loss of sensation or muscle weakness. Associated symptoms comments: Left lateral thigh has some intermittent numbness and tingling. The symptoms are aggravated by palpation. He has tried nothing for the symptoms. The treatment provided no relief.     +history of DVT    Review of Systems   Constitutional: Negative for activity change, appetite change, chills, diaphoresis, fatigue, fever and unexpected weight change.   HENT: Negative.  Negative for congestion, hearing loss, postnasal drip, rhinorrhea, sore throat, trouble swallowing and voice change.    Eyes: Negative.  Negative for visual disturbance.   Respiratory: Negative.  Negative for cough, choking, chest tightness and shortness of breath.    Cardiovascular: Negative for chest pain, palpitations and leg swelling.   Gastrointestinal: Negative for abdominal distention, abdominal pain, blood in stool, constipation, diarrhea, nausea and vomiting.   Endocrine: Negative for cold intolerance, heat intolerance, polydipsia and polyuria.   Genitourinary: Negative.  Negative for difficulty urinating and frequency.   Musculoskeletal: Positive for arthralgias and myalgias. Negative for back pain, gait problem, joint swelling, neck pain and neck stiffness.   Skin: Negative for color change, pallor, rash and wound.   Neurological: Positive for tingling and numbness. Negative for dizziness, tremors, weakness, light-headedness and headaches.   Hematological: Negative for adenopathy.   Psychiatric/Behavioral: Negative for  "behavioral problems, confusion, self-injury, sleep disturbance and suicidal ideas. The patient is not nervous/anxious.      Objective:   /70 (BP Location: Left arm, Patient Position: Sitting, BP Method: Large (Manual))   Pulse 87   Temp 98.1 °F (36.7 °C) (Tympanic)   Resp 16   Ht 5' 10" (1.778 m)   Wt 97.6 kg (215 lb 2.7 oz)   SpO2 98%   BMI 30.87 kg/m²     Physical Exam   Constitutional: He is oriented to person, place, and time. He appears well-developed and well-nourished.   HENT:   Head: Normocephalic and atraumatic.   Right Ear: External ear normal.   Left Ear: External ear normal.   Nose: Nose normal.   Mouth/Throat: Oropharynx is clear and moist.   Eyes: Pupils are equal, round, and reactive to light. Conjunctivae and EOM are normal.   Neck: Normal range of motion. Neck supple.   Cardiovascular: Normal rate, regular rhythm and normal heart sounds. Exam reveals no gallop and no friction rub.   No murmur heard.  Pulmonary/Chest: Effort normal and breath sounds normal. No respiratory distress. He has no wheezes. He has no rales. He exhibits no tenderness.   Abdominal: Soft. He exhibits no distension. There is no tenderness.   Musculoskeletal: Normal range of motion.        Left knee: He exhibits normal range of motion, no swelling, no effusion, no ecchymosis, no deformity, no laceration, no erythema, normal alignment, no LCL laxity, normal patellar mobility, no bony tenderness, normal meniscus and no MCL laxity. Tenderness found.        Legs:  Lymphadenopathy:     He has no cervical adenopathy.   Neurological: He is alert and oriented to person, place, and time.   Skin: Skin is warm and dry.   Psychiatric: He has a normal mood and affect. His behavior is normal. Judgment and thought content normal.   Vitals reviewed.      Assessment:     1. Pain of left lower extremity      Plan:   Pain of left lower extremity  -     US Lower Extremity Veins Left; Future; Expected date: 07/26/2019  -     SONYA kaur, " quantitative; Future; Expected date: 07/26/2019  -     X-ray Knee Ortho Left; Future; Expected date: 07/26/2019      Follow up if symptoms worsen or fail to improve.

## 2019-08-05 ENCOUNTER — PATIENT MESSAGE (OUTPATIENT)
Dept: PULMONOLOGY | Facility: CLINIC | Age: 59
End: 2019-08-05

## 2019-09-03 ENCOUNTER — PATIENT MESSAGE (OUTPATIENT)
Dept: PULMONOLOGY | Facility: CLINIC | Age: 59
End: 2019-09-03

## 2019-09-15 NOTE — PROCEDURES
Home Sleep Studies  Date/Time: 9/26/2018 1:16 PM  Performed by: Genaro Styles MD  Authorized by: Genaro Stlyes MD       Assessment and Recommendations  Study was completed with 3 night protocol. Data on the 3rd night was adequate for analysis. Duration was 7 hr  34 min. Lowest oxygen saturation was 81% average heart rate was 78 beats per minute. Snoring was recorded  above 50 decibels 94% of the time.  Apnea-hypopnea index: AHI: 11.3 events per hour. Total events 86.  Mild obstructive sleep apnea-hypopnea syndrome  Treatment recommendations:  CPAP would be the guideline recommendation for first-line treatment for obstructive sleep apnea.  Either order in lab CPAP titration or AutoPAP device.  Follow-up evaluation and treatment in the sleep disorder clinic.  Other modalities for treatment of obstructive sleep apnea may be explored in patients with intolerant to CPAP including  evaluation by ear nose and throat, mandibular advancement device, Nasal Provent, Hypoglossal nerve stimulator ( INSPIRE)  ,nonsupine sleep positioning device. Significant weight loss is recommended to normal ranges.  Close follow-up to ensure resolution of symptoms.  
aching

## 2019-10-08 ENCOUNTER — LAB VISIT (OUTPATIENT)
Dept: LAB | Facility: HOSPITAL | Age: 59
End: 2019-10-08
Attending: INTERNAL MEDICINE
Payer: COMMERCIAL

## 2019-10-08 DIAGNOSIS — E78.00 PURE HYPERCHOLESTEROLEMIA: ICD-10-CM

## 2019-10-08 DIAGNOSIS — R93.1 AGATSTON CORONARY ARTERY CALCIUM SCORE LESS THAN 100: ICD-10-CM

## 2019-10-08 LAB
ALBUMIN SERPL BCP-MCNC: 3.8 G/DL (ref 3.5–5.2)
ALP SERPL-CCNC: 79 U/L (ref 55–135)
ALT SERPL W/O P-5'-P-CCNC: 22 U/L (ref 10–44)
ANION GAP SERPL CALC-SCNC: 8 MMOL/L (ref 8–16)
AST SERPL-CCNC: 22 U/L (ref 10–40)
BILIRUB SERPL-MCNC: 0.3 MG/DL (ref 0.1–1)
BUN SERPL-MCNC: 15 MG/DL (ref 6–20)
CALCIUM SERPL-MCNC: 9.3 MG/DL (ref 8.7–10.5)
CHLORIDE SERPL-SCNC: 106 MMOL/L (ref 95–110)
CHOLEST SERPL-MCNC: 144 MG/DL (ref 120–199)
CHOLEST/HDLC SERPL: 3.2 {RATIO} (ref 2–5)
CO2 SERPL-SCNC: 27 MMOL/L (ref 23–29)
CREAT SERPL-MCNC: 1.4 MG/DL (ref 0.5–1.4)
EST. GFR  (AFRICAN AMERICAN): >60 ML/MIN/1.73 M^2
EST. GFR  (NON AFRICAN AMERICAN): 55 ML/MIN/1.73 M^2
GLUCOSE SERPL-MCNC: 95 MG/DL (ref 70–110)
HDLC SERPL-MCNC: 45 MG/DL (ref 40–75)
HDLC SERPL: 31.3 % (ref 20–50)
LDLC SERPL CALC-MCNC: 78 MG/DL (ref 63–159)
NONHDLC SERPL-MCNC: 99 MG/DL
POTASSIUM SERPL-SCNC: 4.5 MMOL/L (ref 3.5–5.1)
PROT SERPL-MCNC: 6.6 G/DL (ref 6–8.4)
SODIUM SERPL-SCNC: 141 MMOL/L (ref 136–145)
TRIGL SERPL-MCNC: 105 MG/DL (ref 30–150)

## 2019-10-08 PROCEDURE — 36415 COLL VENOUS BLD VENIPUNCTURE: CPT

## 2019-10-08 PROCEDURE — 80061 LIPID PANEL: CPT

## 2019-10-08 PROCEDURE — 80053 COMPREHEN METABOLIC PANEL: CPT

## 2019-10-18 ENCOUNTER — CLINICAL SUPPORT (OUTPATIENT)
Dept: CARDIOLOGY | Facility: CLINIC | Age: 59
End: 2019-10-18
Attending: INTERNAL MEDICINE
Payer: COMMERCIAL

## 2019-10-18 ENCOUNTER — OFFICE VISIT (OUTPATIENT)
Dept: CARDIOLOGY | Facility: CLINIC | Age: 59
End: 2019-10-18
Payer: COMMERCIAL

## 2019-10-18 VITALS
OXYGEN SATURATION: 97 % | BODY MASS INDEX: 31.34 KG/M2 | SYSTOLIC BLOOD PRESSURE: 130 MMHG | HEART RATE: 77 BPM | HEIGHT: 70 IN | DIASTOLIC BLOOD PRESSURE: 80 MMHG | WEIGHT: 218.94 LBS

## 2019-10-18 DIAGNOSIS — G47.33 OSA ON CPAP: ICD-10-CM

## 2019-10-18 DIAGNOSIS — E78.00 PURE HYPERCHOLESTEROLEMIA: ICD-10-CM

## 2019-10-18 DIAGNOSIS — I10 ESSENTIAL HYPERTENSION: ICD-10-CM

## 2019-10-18 DIAGNOSIS — R93.1 AGATSTON CORONARY ARTERY CALCIUM SCORE LESS THAN 100: ICD-10-CM

## 2019-10-18 DIAGNOSIS — R06.02 SHORTNESS OF BREATH: Primary | ICD-10-CM

## 2019-10-18 LAB — DIASTOLIC DYSFUNCTION: NO

## 2019-10-18 PROCEDURE — 99999 PR PBB SHADOW E&M-EST. PATIENT-LVL III: CPT | Mod: PBBFAC,,, | Performed by: INTERNAL MEDICINE

## 2019-10-18 PROCEDURE — 93015 CARDIAC TREADMILL STRESS TEST: ICD-10-PCS | Mod: S$GLB,,, | Performed by: INTERNAL MEDICINE

## 2019-10-18 PROCEDURE — 99214 PR OFFICE/OUTPT VISIT, EST, LEVL IV, 30-39 MIN: ICD-10-PCS | Mod: S$GLB,,, | Performed by: INTERNAL MEDICINE

## 2019-10-18 PROCEDURE — 99999 PR PBB SHADOW E&M-EST. PATIENT-LVL III: ICD-10-PCS | Mod: PBBFAC,,, | Performed by: INTERNAL MEDICINE

## 2019-10-18 PROCEDURE — 99214 OFFICE O/P EST MOD 30 MIN: CPT | Mod: S$GLB,,, | Performed by: INTERNAL MEDICINE

## 2019-10-18 PROCEDURE — 93015 CV STRESS TEST SUPVJ I&R: CPT | Mod: S$GLB,,, | Performed by: INTERNAL MEDICINE

## 2019-10-18 NOTE — PROGRESS NOTES
Subjective:   Patient ID:  Myles Huynh is a 58 y.o. male who presents for follow up of Agatston coronary atery calcium score less than 100 (6 month f/u)      HPI  A 57 yo male with zain on cpap hlp positive ca score. He salvatore noticed since last visit if he works in the heat he gets short of breath . He has not checked his bp he also does not have any symptoms when he goes for a walk. No otherwise exercise induced shortness of breath. He has decreased his weight exercise due to muscle aches. His ldl is improved.   Past Medical History:   Diagnosis Date    Colon polyps     DVT (deep venous thrombosis)     Esophageal stricture     IBS (irritable bowel syndrome)     Pure hypercholesterolemia 5/11/2018       Past Surgical History:   Procedure Laterality Date    APPENDECTOMY      COLONOSCOPY W/ POLYPECTOMY      MOUTH SURGERY Left 09/2018    Oral Surrgery (dental)    ROTATOR CUFF REPAIR Right 12/2017       Social History     Tobacco Use    Smoking status: Never Smoker    Smokeless tobacco: Never Used   Substance Use Topics    Alcohol use: Yes     Comment: social    Drug use: No       Family History   Problem Relation Age of Onset    Diabetes Brother     Colon cancer Brother 40    No Known Problems Mother     No Known Problems Father        Current Outpatient Medications   Medication Sig    aspirin (ECOTRIN) 81 MG EC tablet Take 1 tablet (81 mg total) by mouth once daily.    atorvastatin (LIPITOR) 40 MG tablet Take 1 tablet (40 mg total) by mouth once daily.    esomeprazole (NEXIUM) 20 MG capsule Take 20 mg by mouth before dinner.     losartan (COZAAR) 100 MG tablet Take 1 tablet (100 mg total) by mouth once daily.    azithromycin (Z-DAFNE) 250 MG tablet      No current facility-administered medications for this visit.      Current Outpatient Medications on File Prior to Visit   Medication Sig    aspirin (ECOTRIN) 81 MG EC tablet Take 1 tablet (81 mg total) by mouth once daily.    atorvastatin  (LIPITOR) 40 MG tablet Take 1 tablet (40 mg total) by mouth once daily.    esomeprazole (NEXIUM) 20 MG capsule Take 20 mg by mouth before dinner.     losartan (COZAAR) 100 MG tablet Take 1 tablet (100 mg total) by mouth once daily.    azithromycin (Z-DAFNE) 250 MG tablet      No current facility-administered medications on file prior to visit.      Review of patient's allergies indicates:   Allergen Reactions    Flagyl [metronidazole] Swelling     Throat swelling     Review of Systems   Constitution: Negative for malaise/fatigue.   Eyes: Negative for blurred vision.   Cardiovascular: Positive for dyspnea on exertion. Negative for chest pain, claudication, cyanosis, irregular heartbeat, leg swelling, near-syncope, orthopnea, palpitations and paroxysmal nocturnal dyspnea.   Respiratory: Positive for shortness of breath. Negative for cough and hemoptysis.    Hematologic/Lymphatic: Negative for bleeding problem. Does not bruise/bleed easily.   Skin: Negative for dry skin and itching.   Musculoskeletal: Negative for falls, muscle weakness and myalgias.   Gastrointestinal: Negative for abdominal pain, diarrhea, heartburn, hematemesis, hematochezia and melena.   Genitourinary: Negative for flank pain and hematuria.   Neurological: Negative for dizziness, focal weakness, headaches, light-headedness, numbness, paresthesias, seizures and weakness.   Psychiatric/Behavioral: Negative for altered mental status and memory loss. The patient is not nervous/anxious.    Allergic/Immunologic: Negative for hives.       Objective:   Physical Exam   Constitutional: He is oriented to person, place, and time. He appears well-developed and well-nourished. No distress.   HENT:   Head: Normocephalic and atraumatic.   Eyes: Pupils are equal, round, and reactive to light. EOM are normal. Right eye exhibits no discharge. Left eye exhibits no discharge.   Neck: Neck supple. No JVD present. No thyromegaly present.   Cardiovascular: Normal rate,  "regular rhythm, normal heart sounds and intact distal pulses. Exam reveals no gallop and no friction rub.   No murmur heard.  Pulmonary/Chest: Effort normal and breath sounds normal. No respiratory distress. He has no wheezes. He has no rales. He exhibits no tenderness.   Abdominal: Soft. Bowel sounds are normal. He exhibits no distension. There is no tenderness.   Musculoskeletal: Normal range of motion. He exhibits no edema.   Neurological: He is alert and oriented to person, place, and time. No cranial nerve deficit.   Skin: Skin is warm and dry. No rash noted. He is not diaphoretic. No erythema.   Psychiatric: He has a normal mood and affect. His behavior is normal.   Nursing note and vitals reviewed.    Vitals:    10/18/19 1324 10/18/19 1327   BP: 132/82 130/80   BP Location: Left arm Right arm   Patient Position: Sitting Sitting   BP Method: Large (Manual) Large (Manual)   Pulse: 77    SpO2: 97%    Weight: 99.3 kg (218 lb 14.7 oz)    Height: 5' 10" (1.778 m)      Lab Results   Component Value Date    CHOL 144 10/08/2019    CHOL 206 (H) 04/10/2019    CHOL 208 (H) 10/15/2018     Lab Results   Component Value Date    HDL 45 10/08/2019    HDL 56 04/10/2019    HDL 56 10/15/2018     Lab Results   Component Value Date    LDLCALC 78.0 10/08/2019    LDLCALC 119.0 04/10/2019    LDLCALC 121.4 10/15/2018     Lab Results   Component Value Date    TRIG 105 10/08/2019    TRIG 155 (H) 04/10/2019    TRIG 153 (H) 10/15/2018     Lab Results   Component Value Date    CHOLHDL 31.3 10/08/2019    CHOLHDL 27.2 04/10/2019    CHOLHDL 26.9 10/15/2018       Chemistry        Component Value Date/Time     10/08/2019 0847    K 4.5 10/08/2019 0847     10/08/2019 0847    CO2 27 10/08/2019 0847    BUN 15 10/08/2019 0847    CREATININE 1.4 10/08/2019 0847    GLU 95 10/08/2019 0847        Component Value Date/Time    CALCIUM 9.3 10/08/2019 0847    ALKPHOS 79 10/08/2019 0847    AST 22 10/08/2019 0847    ALT 22 10/08/2019 0847    " BILITOT 0.3 10/08/2019 0847    ESTGFRAFRICA >60.0 10/08/2019 0847    EGFRNONAA 55.0 (A) 10/08/2019 0847          Lab Results   Component Value Date    TSH 1.395 04/10/2018     No results found for: INR, PROTIME  Lab Results   Component Value Date    WBC 7.68 11/10/2017    HGB 14.8 11/10/2017    HCT 45.0 11/10/2017     (H) 11/10/2017     11/10/2017     BMP  Sodium   Date Value Ref Range Status   10/08/2019 141 136 - 145 mmol/L Final     Potassium   Date Value Ref Range Status   10/08/2019 4.5 3.5 - 5.1 mmol/L Final     Chloride   Date Value Ref Range Status   10/08/2019 106 95 - 110 mmol/L Final     CO2   Date Value Ref Range Status   10/08/2019 27 23 - 29 mmol/L Final     BUN, Bld   Date Value Ref Range Status   10/08/2019 15 6 - 20 mg/dL Final     Creatinine   Date Value Ref Range Status   10/08/2019 1.4 0.5 - 1.4 mg/dL Final     Calcium   Date Value Ref Range Status   10/08/2019 9.3 8.7 - 10.5 mg/dL Final     Anion Gap   Date Value Ref Range Status   10/08/2019 8 8 - 16 mmol/L Final     eGFR if    Date Value Ref Range Status   10/08/2019 >60.0 >60 mL/min/1.73 m^2 Final     eGFR if non    Date Value Ref Range Status   10/08/2019 55.0 (A) >60 mL/min/1.73 m^2 Final     Comment:     Calculation used to obtain the estimated glomerular filtration  rate (eGFR) is the CKD-EPI equation.        CrCl cannot be calculated (Patient's most recent lab result is older than the maximum 7 days allowed.).    Assessment:     1. Pure hypercholesterolemia    2. Agatston coronary artery calcium score less than 100    3. Essential hypertension    4. TIFFANY on CPAP    5. Shortness of breath      Stable clinically concerned about the shortness of breath will repeat stress test.  Plan:   ett   Continue current therapy  Cardiac low salt diet.  Risk factor modification and excercise program./weight loss  F/u in 6 months with lipid cmp.

## 2019-10-21 ENCOUNTER — TELEPHONE (OUTPATIENT)
Dept: CARDIOLOGY | Facility: CLINIC | Age: 59
End: 2019-10-21

## 2019-11-10 ENCOUNTER — PATIENT MESSAGE (OUTPATIENT)
Dept: SLEEP MEDICINE | Facility: CLINIC | Age: 59
End: 2019-11-10

## 2019-11-15 DIAGNOSIS — I10 HYPERTENSION, UNSPECIFIED TYPE: ICD-10-CM

## 2019-11-15 RX ORDER — LOSARTAN POTASSIUM 100 MG/1
TABLET ORAL
Qty: 90 TABLET | Refills: 0 | Status: SHIPPED | OUTPATIENT
Start: 2019-11-15 | End: 2019-12-12 | Stop reason: SDUPTHER

## 2019-11-16 ENCOUNTER — PATIENT MESSAGE (OUTPATIENT)
Dept: CARDIOLOGY | Facility: CLINIC | Age: 59
End: 2019-11-16

## 2019-11-18 ENCOUNTER — TELEPHONE (OUTPATIENT)
Dept: CARDIOLOGY | Facility: CLINIC | Age: 59
End: 2019-11-18

## 2019-11-18 NOTE — TELEPHONE ENCOUNTER
Informed pt that his medication was sent to the pharmacy but he is due for an appointment. He verbalized understanding and stated he will schedule an appt via portal.

## 2019-11-18 NOTE — TELEPHONE ENCOUNTER
Spoke with patient about staying well hydrated and if he still having symptoms to follow up in clinic soon as possible. Patient stated will be out of town for the next two weeks but will return around the first week of December. Verbalizes understanding.

## 2019-12-06 DIAGNOSIS — E78.00 PURE HYPERCHOLESTEROLEMIA: ICD-10-CM

## 2019-12-06 DIAGNOSIS — R93.1 AGATSTON CORONARY ARTERY CALCIUM SCORE LESS THAN 100: ICD-10-CM

## 2019-12-06 DIAGNOSIS — I10 ESSENTIAL HYPERTENSION: ICD-10-CM

## 2019-12-06 RX ORDER — PRAVASTATIN SODIUM 40 MG/1
TABLET ORAL
Qty: 90 TABLET | Refills: 3 | Status: SHIPPED | OUTPATIENT
Start: 2019-12-06 | End: 2019-12-12

## 2019-12-12 ENCOUNTER — OFFICE VISIT (OUTPATIENT)
Dept: INTERNAL MEDICINE | Facility: CLINIC | Age: 59
End: 2019-12-12
Payer: COMMERCIAL

## 2019-12-12 VITALS
OXYGEN SATURATION: 96 % | TEMPERATURE: 98 F | BODY MASS INDEX: 30.84 KG/M2 | DIASTOLIC BLOOD PRESSURE: 84 MMHG | HEART RATE: 115 BPM | WEIGHT: 214.94 LBS | SYSTOLIC BLOOD PRESSURE: 132 MMHG

## 2019-12-12 DIAGNOSIS — G47.33 OSA ON CPAP: ICD-10-CM

## 2019-12-12 DIAGNOSIS — R93.1 AGATSTON CORONARY ARTERY CALCIUM SCORE LESS THAN 100: ICD-10-CM

## 2019-12-12 DIAGNOSIS — I10 ESSENTIAL HYPERTENSION: Primary | ICD-10-CM

## 2019-12-12 DIAGNOSIS — I10 HYPERTENSION, UNSPECIFIED TYPE: ICD-10-CM

## 2019-12-12 DIAGNOSIS — Z23 NEED FOR INFLUENZA VACCINATION: ICD-10-CM

## 2019-12-12 PROBLEM — R06.02 SHORTNESS OF BREATH: Status: RESOLVED | Noted: 2019-10-18 | Resolved: 2019-12-12

## 2019-12-12 PROCEDURE — 90686 IIV4 VACC NO PRSV 0.5 ML IM: CPT | Mod: S$GLB,,, | Performed by: INTERNAL MEDICINE

## 2019-12-12 PROCEDURE — 99214 OFFICE O/P EST MOD 30 MIN: CPT | Mod: 25,S$GLB,, | Performed by: INTERNAL MEDICINE

## 2019-12-12 PROCEDURE — 99999 PR PBB SHADOW E&M-EST. PATIENT-LVL IV: CPT | Mod: PBBFAC,,, | Performed by: INTERNAL MEDICINE

## 2019-12-12 PROCEDURE — 99214 PR OFFICE/OUTPT VISIT, EST, LEVL IV, 30-39 MIN: ICD-10-PCS | Mod: 25,S$GLB,, | Performed by: INTERNAL MEDICINE

## 2019-12-12 PROCEDURE — 90471 IMMUNIZATION ADMIN: CPT | Mod: S$GLB,,, | Performed by: INTERNAL MEDICINE

## 2019-12-12 PROCEDURE — 90471 FLU VACCINE (QUAD) GREATER THAN OR EQUAL TO 3YO PRESERVATIVE FREE IM: ICD-10-PCS | Mod: S$GLB,,, | Performed by: INTERNAL MEDICINE

## 2019-12-12 PROCEDURE — 99999 PR PBB SHADOW E&M-EST. PATIENT-LVL IV: ICD-10-PCS | Mod: PBBFAC,,, | Performed by: INTERNAL MEDICINE

## 2019-12-12 PROCEDURE — 90686 FLU VACCINE (QUAD) GREATER THAN OR EQUAL TO 3YO PRESERVATIVE FREE IM: ICD-10-PCS | Mod: S$GLB,,, | Performed by: INTERNAL MEDICINE

## 2019-12-12 RX ORDER — LOSARTAN POTASSIUM 100 MG/1
100 TABLET ORAL DAILY
Qty: 90 TABLET | Refills: 1 | Status: SHIPPED | OUTPATIENT
Start: 2019-12-12 | End: 2020-02-14 | Stop reason: SDUPTHER

## 2019-12-12 RX ORDER — FLUTICASONE PROPIONATE 220 UG/1
2 AEROSOL, METERED RESPIRATORY (INHALATION) DAILY
COMMUNITY
Start: 2018-12-01

## 2019-12-14 NOTE — PROGRESS NOTES
Subjective:      Patient ID: Myles Huynh is a 59 y.o. male.    Chief Complaint: Follow-up    HPI     58 yo with   Patient Active Problem List   Diagnosis    External hemorrhoid    IBS (irritable bowel syndrome)    History of colon polyps    Pure hypercholesterolemia    Agatston coronary artery calcium score less than 100    Essential hypertension    Eosinophilic esophagitis    TIFFANY on CPAP     Past Medical History:   Diagnosis Date    Colon polyps     DVT (deep venous thrombosis)     Esophageal stricture     IBS (irritable bowel syndrome)     Pure hypercholesterolemia 5/11/2018     Here today for management of multiple medical problems as outlined below.  He is feeling well in his usual state of health.  He has no new complaints.  He reports compliance with his medications without significant side effects.  Review of Systems   Constitutional: Negative for activity change, chills and fever.   HENT: Negative for ear pain and sore throat.    Eyes: Negative for discharge.   Respiratory: Negative for cough and wheezing.    Cardiovascular: Negative for chest pain and palpitations.   Gastrointestinal: Negative for abdominal pain, blood in stool, constipation, diarrhea and vomiting.   Genitourinary: Negative for difficulty urinating, dysuria and hematuria.   Neurological: Negative for seizures, syncope and headaches.   Psychiatric/Behavioral: Negative for dysphoric mood.     Objective:   /84   Pulse (!) 115   Temp 97.9 °F (36.6 °C) (Tympanic)   Wt 97.5 kg (214 lb 15.2 oz)   SpO2 96%   BMI 30.84 kg/m²     Physical Exam   Constitutional: He is oriented to person, place, and time. He appears well-developed and well-nourished. No distress.   HENT:   Head: Normocephalic and atraumatic.   Mouth/Throat: Oropharynx is clear and moist.   Eyes: Pupils are equal, round, and reactive to light. EOM are normal.   Neck: Neck supple. No thyromegaly present.   Cardiovascular: Normal rate and regular rhythm.    Pulmonary/Chest: Breath sounds normal. He has no wheezes. He has no rales.   Abdominal: Soft. Bowel sounds are normal. There is no tenderness.   Musculoskeletal: He exhibits no edema.   Lymphadenopathy:     He has no cervical adenopathy.   Neurological: He is alert and oriented to person, place, and time.   Skin: Skin is warm and dry.   Psychiatric: He has a normal mood and affect. His behavior is normal.       Assessment:     1. Essential hypertension    2. TIFFANY on CPAP    3. Agatston coronary artery calcium score less than 100    4. Hypertension, unspecified type    5. Need for influenza vaccination      Plan:   Essential hypertension  Controlled.  Continue current medications  TIFFANY on CPAP  Stable on CPAP  Agatston coronary artery calcium score less than 100  Continue statin  Hypertension, unspecified type  -     losartan (COZAAR) 100 MG tablet; Take 1 tablet (100 mg total) by mouth once daily.  Dispense: 90 tablet; Refill: 1    Need for influenza vaccination    Other orders  -     Influenza - Quadrivalent (PF)        Lab Frequency Next Occurrence   US Lower Extremity Veins Left Once 07/26/2019   Comprehensive metabolic panel Once 04/18/2020   Lipid panel Once 04/18/2020       Problem List Items Addressed This Visit        Cardiac/Vascular    Agatston coronary artery calcium score less than 100    Essential hypertension - Primary       Other    TIFFANY on CPAP      Other Visit Diagnoses     Hypertension, unspecified type        Relevant Medications    losartan (COZAAR) 100 MG tablet    Need for influenza vaccination              Follow up in about 6 months (around 6/12/2020), or if symptoms worsen or fail to improve.

## 2019-12-19 ENCOUNTER — OFFICE VISIT (OUTPATIENT)
Dept: PULMONOLOGY | Facility: CLINIC | Age: 59
End: 2019-12-19
Payer: COMMERCIAL

## 2019-12-19 VITALS
BODY MASS INDEX: 31.5 KG/M2 | WEIGHT: 220 LBS | HEIGHT: 70 IN | OXYGEN SATURATION: 97 % | HEART RATE: 88 BPM | SYSTOLIC BLOOD PRESSURE: 138 MMHG | RESPIRATION RATE: 22 BRPM | DIASTOLIC BLOOD PRESSURE: 72 MMHG

## 2019-12-19 DIAGNOSIS — G47.33 OSA ON CPAP: Primary | ICD-10-CM

## 2019-12-19 PROCEDURE — 99999 PR PBB SHADOW E&M-EST. PATIENT-LVL III: CPT | Mod: PBBFAC,,, | Performed by: NURSE PRACTITIONER

## 2019-12-19 PROCEDURE — 99213 OFFICE O/P EST LOW 20 MIN: CPT | Mod: S$GLB,,, | Performed by: NURSE PRACTITIONER

## 2019-12-19 PROCEDURE — 99999 PR PBB SHADOW E&M-EST. PATIENT-LVL III: ICD-10-PCS | Mod: PBBFAC,,, | Performed by: NURSE PRACTITIONER

## 2019-12-19 PROCEDURE — 99213 PR OFFICE/OUTPT VISIT, EST, LEVL III, 20-29 MIN: ICD-10-PCS | Mod: S$GLB,,, | Performed by: NURSE PRACTITIONER

## 2019-12-19 NOTE — PROGRESS NOTES
"Subjective:      Patient ID: Myles Huynh is a 59 y.o. male.    Chief Complaint: No chief complaint on file.    HPI  Presents to office for review of AutoPAP therapy. Patient states improved symptoms with use of AutoPAP. Sleeping more soundly. Waking up feeling more refreshed. Improved daytime sleepiness. Patient states he is benefiting from use of the AutoPAP.     Patient Active Problem List   Diagnosis    External hemorrhoid    IBS (irritable bowel syndrome)    History of colon polyps    Pure hypercholesterolemia    Agatston coronary artery calcium score less than 100    Essential hypertension    Eosinophilic esophagitis    TIFFANY on CPAP       /72   Pulse 88   Resp (!) 22   Ht 5' 10" (1.778 m)   Wt 99.8 kg (220 lb 0.3 oz)   SpO2 97%   BMI 31.57 kg/m²   Body mass index is 31.57 kg/m².    Review of Systems   Constitutional: Negative.    HENT: Negative.    Respiratory: Negative.    Cardiovascular: Negative.    Musculoskeletal: Negative.    Gastrointestinal: Negative.    Neurological: Negative.    Psychiatric/Behavioral: Negative.      Objective:      Physical Exam   Constitutional: He is oriented to person, place, and time. He appears well-developed and well-nourished.   HENT:   Head: Normocephalic and atraumatic.   Neck: Normal range of motion. Neck supple.   Cardiovascular: Normal rate and regular rhythm.   Pulmonary/Chest: Effort normal and breath sounds normal.   Musculoskeletal: He exhibits no edema.   Neurological: He is alert and oriented to person, place, and time.   Skin: Skin is warm and dry.   Psychiatric: He has a normal mood and affect.     Personal Diagnostic Review    Compliance Information  Myles Huynh  Device: DreamStation Auto CPAP (500X110) ([id number])  Summary Report  Patient:  Lynch, [facility]  Patient ID:  11/18/2019 - 12/17/2019  YOB: 1960  Mask:  Compliance Summary  11/18/2019 - 12/17/2019 (30 days)  Days with Device Usage " 29 days  Days without Device Usage 1 day  Percent Days with Device Usage 96.7%  Cumulative Usage 9 days 3 hrs. 19 mins. 18 secs.  Maximum Usage (1 Day) 10 hrs. 35 mins. 35 secs.  Average Usage (All Days) 7 hrs. 18 mins. 38 secs.  Average Usage (Days Used) 7 hrs. 33 mins. 46 secs.  Minimum Usage (1 Day) 5 hrs. 38 mins. 5 secs.  Percent of Days with Usage >= 4 Hours 96.7%  Percent of Days with Usage < 4 Hours 3.3%  Date Range  Total Blower Time 9 days 3 hrs. 22 mins. 55 secs.  Average AHI 1.6  Auto-CPAP Summary  Auto-CPAP Mean Pressure 7.3 cmH2O  Auto-CPAP Peak Average Pressure 9.3 cmH2O  Average Device Pressure <= 90% of Time 9.6 cmH2O  Average Time in Large Leak Per Day 4 mins. 56 sec    Assessment:       1. TIFFANY on CPAP        Outpatient Encounter Medications as of 12/19/2019   Medication Sig Dispense Refill    aspirin (ECOTRIN) 81 MG EC tablet Take 1 tablet (81 mg total) by mouth once daily. 30 tablet 6    atorvastatin (LIPITOR) 40 MG tablet Take 1 tablet (40 mg total) by mouth once daily. 90 tablet 3    esomeprazole (NEXIUM) 20 MG capsule Take 20 mg by mouth before dinner.       fluticasone propionate (FLOVENT HFA) 220 mcg/actuation inhaler Inhale 2 puffs into the lungs once daily.       losartan (COZAAR) 100 MG tablet Take 1 tablet (100 mg total) by mouth once daily. 90 tablet 1     No facility-administered encounter medications on file as of 12/19/2019.      Orders Placed This Encounter   Procedures    CPAP/BIPAP SUPPLIES     Order Specific Question:   Type of mask:     Answer:   FFM     Order Specific Question:   Headgear?     Answer:   Yes     Order Specific Question:   Tubing?     Answer:   Yes     Order Specific Question:   Humidifier chamber?     Answer:   Yes     Order Specific Question:   Chin strap?     Answer:   Yes     Order Specific Question:   Filters?     Answer:   Yes     Order Specific Question:   Cushions?     Answer:   Yes     Order Specific Question:   Length of need (1-99 months):      Answer:   99     Plan:      Compliant with PAP and benefits from use. Follow up annually in the sleep clinic.    Problem List Items Addressed This Visit        Other    TIFFANY on CPAP - Primary    Relevant Orders    CPAP/BIPAP SUPPLIES

## 2020-02-14 ENCOUNTER — PATIENT MESSAGE (OUTPATIENT)
Dept: CARDIOLOGY | Facility: CLINIC | Age: 60
End: 2020-02-14

## 2020-02-14 ENCOUNTER — PATIENT MESSAGE (OUTPATIENT)
Dept: INTERNAL MEDICINE | Facility: CLINIC | Age: 60
End: 2020-02-14

## 2020-02-14 DIAGNOSIS — E78.00 PURE HYPERCHOLESTEROLEMIA: ICD-10-CM

## 2020-02-14 DIAGNOSIS — R93.1 AGATSTON CORONARY ARTERY CALCIUM SCORE LESS THAN 100: ICD-10-CM

## 2020-02-14 DIAGNOSIS — I10 HYPERTENSION, UNSPECIFIED TYPE: ICD-10-CM

## 2020-02-14 RX ORDER — ATORVASTATIN CALCIUM 40 MG/1
40 TABLET, FILM COATED ORAL DAILY
Qty: 90 TABLET | Refills: 3 | Status: SHIPPED | OUTPATIENT
Start: 2020-02-14

## 2020-02-14 RX ORDER — LOSARTAN POTASSIUM 100 MG/1
100 TABLET ORAL DAILY
Qty: 90 TABLET | Refills: 1 | Status: SHIPPED | OUTPATIENT
Start: 2020-02-14 | End: 2020-06-30

## 2020-06-30 DIAGNOSIS — I10 HYPERTENSION, UNSPECIFIED TYPE: ICD-10-CM

## 2020-06-30 RX ORDER — LOSARTAN POTASSIUM 100 MG/1
TABLET ORAL
Qty: 90 TABLET | Refills: 0 | Status: SHIPPED | OUTPATIENT
Start: 2020-06-30 | End: 2020-09-07

## 2020-09-07 DIAGNOSIS — I10 HYPERTENSION, UNSPECIFIED TYPE: ICD-10-CM

## 2020-09-07 RX ORDER — LOSARTAN POTASSIUM 100 MG/1
TABLET ORAL
Qty: 90 TABLET | Refills: 0 | Status: SHIPPED | OUTPATIENT
Start: 2020-09-07

## 2020-09-08 NOTE — TELEPHONE ENCOUNTER
Informed pt that his losartan was refilled but he is due for an appointment. He stated that he is in [place] because of the corona virus and cannot schedule an appointment right now.

## 2021-03-18 ENCOUNTER — PATIENT OUTREACH (OUTPATIENT)
Dept: ADMINISTRATIVE | Facility: HOSPITAL | Age: 61
End: 2021-03-18

## 2021-03-25 ENCOUNTER — PATIENT MESSAGE (OUTPATIENT)
Dept: ADMINISTRATIVE | Facility: HOSPITAL | Age: 61
End: 2021-03-25

## 2021-10-04 ENCOUNTER — PATIENT MESSAGE (OUTPATIENT)
Dept: ADMINISTRATIVE | Facility: HOSPITAL | Age: 61
End: 2021-10-04

## 2021-10-05 ENCOUNTER — PATIENT OUTREACH (OUTPATIENT)
Dept: ADMINISTRATIVE | Facility: HOSPITAL | Age: 61
End: 2021-10-05